# Patient Record
Sex: MALE | Race: WHITE | Employment: UNEMPLOYED | URBAN - METROPOLITAN AREA
[De-identification: names, ages, dates, MRNs, and addresses within clinical notes are randomized per-mention and may not be internally consistent; named-entity substitution may affect disease eponyms.]

---

## 2022-02-01 ENCOUNTER — APPOINTMENT (OUTPATIENT)
Dept: CT IMAGING | Age: 82
DRG: 308 | End: 2022-02-01
Payer: MEDICARE

## 2022-02-01 ENCOUNTER — HOSPITAL ENCOUNTER (INPATIENT)
Age: 82
LOS: 6 days | Discharge: NURSING, REHAB OR LONG-TERM CARE | DRG: 308 | End: 2022-02-07
Attending: EMERGENCY MEDICINE | Admitting: INTERNAL MEDICINE
Payer: MEDICARE

## 2022-02-01 DIAGNOSIS — I48.91 ATRIAL FIBRILLATION WITH RVR (HCC): Primary | ICD-10-CM

## 2022-02-01 PROBLEM — K86.89 PANCREATIC MASS: Status: ACTIVE | Noted: 2022-02-01

## 2022-02-01 PROBLEM — Z98.890 HISTORY OF ERCP: Status: ACTIVE | Noted: 2022-02-01

## 2022-02-01 PROBLEM — E05.90 HYPERTHYROIDISM: Status: ACTIVE | Noted: 2022-02-01

## 2022-02-01 LAB
ABSOLUTE EOS #: 0.04 K/UL (ref 0–0.44)
ABSOLUTE IMMATURE GRANULOCYTE: <0.03 K/UL (ref 0–0.3)
ABSOLUTE LYMPH #: 0.79 K/UL (ref 1.1–3.7)
ABSOLUTE MONO #: 0.5 K/UL (ref 0.1–1.2)
ALBUMIN SERPL-MCNC: 2.3 G/DL (ref 3.5–5.2)
ALBUMIN/GLOBULIN RATIO: 0.8 (ref 1–2.5)
ALP BLD-CCNC: 206 U/L (ref 40–129)
ALT SERPL-CCNC: 30 U/L (ref 5–41)
ANION GAP SERPL CALCULATED.3IONS-SCNC: 10 MMOL/L (ref 9–17)
AST SERPL-CCNC: 48 U/L
BASOPHILS # BLD: 0 % (ref 0–2)
BASOPHILS ABSOLUTE: <0.03 K/UL (ref 0–0.2)
BILIRUB SERPL-MCNC: 2.12 MG/DL (ref 0.3–1.2)
BILIRUBIN DIRECT: 1.43 MG/DL
BILIRUBIN, INDIRECT: 0.69 MG/DL (ref 0–1)
BNP INTERPRETATION: ABNORMAL
BUN BLDV-MCNC: 13 MG/DL (ref 8–23)
BUN/CREAT BLD: ABNORMAL (ref 9–20)
CALCIUM SERPL-MCNC: 8.4 MG/DL (ref 8.6–10.4)
CHLORIDE BLD-SCNC: 105 MMOL/L (ref 98–107)
CO2: 20 MMOL/L (ref 20–31)
CREAT SERPL-MCNC: 0.6 MG/DL (ref 0.7–1.2)
DIFFERENTIAL TYPE: ABNORMAL
EOSINOPHILS RELATIVE PERCENT: 1 % (ref 1–4)
GFR AFRICAN AMERICAN: >60 ML/MIN
GFR NON-AFRICAN AMERICAN: >60 ML/MIN
GFR SERPL CREATININE-BSD FRML MDRD: ABNORMAL ML/MIN/{1.73_M2}
GFR SERPL CREATININE-BSD FRML MDRD: ABNORMAL ML/MIN/{1.73_M2}
GLOBULIN: ABNORMAL G/DL (ref 1.5–3.8)
GLUCOSE BLD-MCNC: 118 MG/DL (ref 70–99)
HCT VFR BLD CALC: 38.3 % (ref 40.7–50.3)
HEMOGLOBIN: 12.4 G/DL (ref 13–17)
IMMATURE GRANULOCYTES: 0 %
LYMPHOCYTES # BLD: 11 % (ref 24–43)
MCH RBC QN AUTO: 32.9 PG (ref 25.2–33.5)
MCHC RBC AUTO-ENTMCNC: 32.4 G/DL (ref 28.4–34.8)
MCV RBC AUTO: 101.6 FL (ref 82.6–102.9)
MONOCYTES # BLD: 7 % (ref 3–12)
NRBC AUTOMATED: 0 PER 100 WBC
PDW BLD-RTO: 15.3 % (ref 11.8–14.4)
PLATELET # BLD: 194 K/UL (ref 138–453)
PLATELET ESTIMATE: ABNORMAL
PMV BLD AUTO: 10.9 FL (ref 8.1–13.5)
POTASSIUM SERPL-SCNC: 4.2 MMOL/L (ref 3.7–5.3)
PRO-BNP: 5356 PG/ML
RBC # BLD: 3.77 M/UL (ref 4.21–5.77)
RBC # BLD: ABNORMAL 10*6/UL
SARS-COV-2, RAPID: NOT DETECTED
SEG NEUTROPHILS: 81 % (ref 36–65)
SEGMENTED NEUTROPHILS ABSOLUTE COUNT: 5.57 K/UL (ref 1.5–8.1)
SODIUM BLD-SCNC: 135 MMOL/L (ref 135–144)
SPECIMEN DESCRIPTION: NORMAL
THYROXINE, FREE: 3.77 NG/DL (ref 0.93–1.7)
TOTAL PROTEIN: 5.3 G/DL (ref 6.4–8.3)
TROPONIN INTERP: NORMAL
TROPONIN T: NORMAL NG/ML
TROPONIN, HIGH SENSITIVITY: 14 NG/L (ref 0–22)
TSH SERPL DL<=0.05 MIU/L-ACNC: <0.01 MIU/L (ref 0.3–5)
WBC # BLD: 6.9 K/UL (ref 3.5–11.3)
WBC # BLD: ABNORMAL 10*3/UL

## 2022-02-01 PROCEDURE — 84443 ASSAY THYROID STIM HORMONE: CPT

## 2022-02-01 PROCEDURE — 93005 ELECTROCARDIOGRAM TRACING: CPT | Performed by: STUDENT IN AN ORGANIZED HEALTH CARE EDUCATION/TRAINING PROGRAM

## 2022-02-01 PROCEDURE — 99222 1ST HOSP IP/OBS MODERATE 55: CPT | Performed by: INTERNAL MEDICINE

## 2022-02-01 PROCEDURE — 2060000000 HC ICU INTERMEDIATE R&B

## 2022-02-01 PROCEDURE — 99221 1ST HOSP IP/OBS SF/LOW 40: CPT | Performed by: NURSE PRACTITIONER

## 2022-02-01 PROCEDURE — 83880 ASSAY OF NATRIURETIC PEPTIDE: CPT

## 2022-02-01 PROCEDURE — 87635 SARS-COV-2 COVID-19 AMP PRB: CPT

## 2022-02-01 PROCEDURE — 84484 ASSAY OF TROPONIN QUANT: CPT

## 2022-02-01 PROCEDURE — 6360000004 HC RX CONTRAST MEDICATION: Performed by: STUDENT IN AN ORGANIZED HEALTH CARE EDUCATION/TRAINING PROGRAM

## 2022-02-01 PROCEDURE — 84439 ASSAY OF FREE THYROXINE: CPT

## 2022-02-01 PROCEDURE — 96365 THER/PROPH/DIAG IV INF INIT: CPT

## 2022-02-01 PROCEDURE — 85025 COMPLETE CBC W/AUTO DIFF WBC: CPT

## 2022-02-01 PROCEDURE — 71260 CT THORAX DX C+: CPT

## 2022-02-01 PROCEDURE — 2580000003 HC RX 258: Performed by: STUDENT IN AN ORGANIZED HEALTH CARE EDUCATION/TRAINING PROGRAM

## 2022-02-01 PROCEDURE — 80076 HEPATIC FUNCTION PANEL: CPT

## 2022-02-01 PROCEDURE — 99284 EMERGENCY DEPT VISIT MOD MDM: CPT

## 2022-02-01 PROCEDURE — 96375 TX/PRO/DX INJ NEW DRUG ADDON: CPT

## 2022-02-01 PROCEDURE — 74177 CT ABD & PELVIS W/CONTRAST: CPT

## 2022-02-01 PROCEDURE — 2500000003 HC RX 250 WO HCPCS: Performed by: STUDENT IN AN ORGANIZED HEALTH CARE EDUCATION/TRAINING PROGRAM

## 2022-02-01 PROCEDURE — 80048 BASIC METABOLIC PNL TOTAL CA: CPT

## 2022-02-01 RX ORDER — ACETAMINOPHEN 650 MG/1
650 SUPPOSITORY RECTAL EVERY 6 HOURS PRN
Status: DISCONTINUED | OUTPATIENT
Start: 2022-02-01 | End: 2022-02-07 | Stop reason: HOSPADM

## 2022-02-01 RX ORDER — SODIUM CHLORIDE 0.9 % (FLUSH) 0.9 %
5-40 SYRINGE (ML) INJECTION PRN
Status: DISCONTINUED | OUTPATIENT
Start: 2022-02-01 | End: 2022-02-07 | Stop reason: HOSPADM

## 2022-02-01 RX ORDER — METOPROLOL TARTRATE 50 MG/1
50 TABLET, FILM COATED ORAL 2 TIMES DAILY
Status: DISCONTINUED | OUTPATIENT
Start: 2022-02-01 | End: 2022-02-02

## 2022-02-01 RX ORDER — ONDANSETRON 4 MG/1
4 TABLET, ORALLY DISINTEGRATING ORAL EVERY 8 HOURS PRN
Status: DISCONTINUED | OUTPATIENT
Start: 2022-02-01 | End: 2022-02-07 | Stop reason: HOSPADM

## 2022-02-01 RX ORDER — METHIMAZOLE 5 MG/1
10 TABLET ORAL 2 TIMES DAILY
Status: DISCONTINUED | OUTPATIENT
Start: 2022-02-01 | End: 2022-02-07 | Stop reason: HOSPADM

## 2022-02-01 RX ORDER — ONDANSETRON 2 MG/ML
4 INJECTION INTRAMUSCULAR; INTRAVENOUS EVERY 6 HOURS PRN
Status: DISCONTINUED | OUTPATIENT
Start: 2022-02-01 | End: 2022-02-07 | Stop reason: HOSPADM

## 2022-02-01 RX ORDER — DILTIAZEM HYDROCHLORIDE 5 MG/ML
10 INJECTION INTRAVENOUS ONCE
Status: COMPLETED | OUTPATIENT
Start: 2022-02-01 | End: 2022-02-01

## 2022-02-01 RX ORDER — ACETAMINOPHEN 325 MG/1
650 TABLET ORAL EVERY 6 HOURS PRN
Status: DISCONTINUED | OUTPATIENT
Start: 2022-02-01 | End: 2022-02-07 | Stop reason: HOSPADM

## 2022-02-01 RX ORDER — SODIUM CHLORIDE 0.9 % (FLUSH) 0.9 %
5-40 SYRINGE (ML) INJECTION EVERY 12 HOURS SCHEDULED
Status: DISCONTINUED | OUTPATIENT
Start: 2022-02-01 | End: 2022-02-07 | Stop reason: HOSPADM

## 2022-02-01 RX ORDER — POLYETHYLENE GLYCOL 3350 17 G/17G
17 POWDER, FOR SOLUTION ORAL DAILY PRN
Status: DISCONTINUED | OUTPATIENT
Start: 2022-02-01 | End: 2022-02-07 | Stop reason: HOSPADM

## 2022-02-01 RX ORDER — SODIUM CHLORIDE 9 MG/ML
25 INJECTION, SOLUTION INTRAVENOUS PRN
Status: DISCONTINUED | OUTPATIENT
Start: 2022-02-01 | End: 2022-02-07 | Stop reason: HOSPADM

## 2022-02-01 RX ADMIN — IOPAMIDOL 75 ML: 755 INJECTION, SOLUTION INTRAVENOUS at 11:28

## 2022-02-01 RX ADMIN — DILTIAZEM HYDROCHLORIDE 10 MG: 5 INJECTION INTRAVENOUS at 10:38

## 2022-02-01 RX ADMIN — DILTIAZEM HYDROCHLORIDE 5 MG/HR: 5 INJECTION INTRAVENOUS at 10:38

## 2022-02-01 RX ADMIN — IOPAMIDOL 75 ML: 755 INJECTION, SOLUTION INTRAVENOUS at 15:25

## 2022-02-01 ASSESSMENT — ENCOUNTER SYMPTOMS
APNEA: 0
ABDOMINAL DISTENTION: 0
STRIDOR: 0
SORE THROAT: 0
DIARRHEA: 0
BLOOD IN STOOL: 0
VOMITING: 0
NAUSEA: 0
ABDOMINAL PAIN: 0
SHORTNESS OF BREATH: 1
CONSTIPATION: 0
WHEEZING: 0
CHOKING: 0
SHORTNESS OF BREATH: 0
COUGH: 0

## 2022-02-01 NOTE — CONSULTS
THE [facility] AT [facility]  Consultation Note     . REASON FOR CONSULTATION:    EUS planned for earlier today  A. fib with RVR now rate controlled  Pneumobilia and portal venous gas found on CT    HISTORY OF PRESENT ILLNESS:     This is a 80 y.o. male with PMH as noted below including pancreatic mass. Patient was scheduled for outpatient EUS but in perioperative developed tachycardia, procedure was canceled and patient was sent to the ER. Patient normally takes Eliquis and this has been on hold the last several days for the procedure. In the ER patient complained of dyspnea upon exertion so CT chest was done that was negative for acute PE but did indicate bilateral patchy irregular groundglass and dense airspace concerning for underlying Covid pneumonia additionally in the abdomen. Large amount of tubular branching gas was noted in the liver compatible with portal venous gas and/or pneumobilia. CT abdomen and pelvis was completed which indicated pancreatic carcinoma with biliary ductal stent in place which accounts for pneumobilia seen on previous study. For the A. fib with RVR patient was started on Cardizem drip    Pt is asymptomatic. No nausea vomiting or abdominal pain.   No recent fevers or chills  Patient does admit he has had anorexia for the last few months    Previous GI history:   No previous EGD or colonoscopy on file  Patient normally follows with Dr. Ekaterina Madrigal as an outpatient with [facility]    Past Medical/Social/Family History:  Past Medical History:   Diagnosis Date    A-fib [facility])     Arthritis     Hypertension     Pancreatic mass     Thyroid disease      Past Surgical History:   Procedure Laterality Date    ERCP      HERNIA REPAIR       Family History   Problem Relation Age of Onset    High Blood Pressure Mother     Stroke Mother     Cancer Father     Colon Cancer Father     Arthritis Father     High Blood Pressure Father      Previous records/history/ and notes were reviewed    Allergies:  No Known Allergies    Home medications:  Prior to Admission medications    Medication Sig Start Date End Date Taking? Authorizing Provider   apixaban (ELIQUIS) 5 MG TABS tablet Take 5 mg by mouth 2 times daily 11/23/21   Historical Provider, MD   magnesium oxide (MAG-OX) 400 MG tablet Take 400 mg by mouth daily 1/21/22   Historical Provider, MD   potassium chloride (KLOR-CON) 10 MEQ extended release tablet Take 10 mEq by mouth 2 times daily 1/21/22   Historical Provider, MD   ursodiol (ACTIGALL) 300 MG capsule Take 300 mg by mouth 2 times daily    Historical Provider, MD   metoprolol tartrate (LOPRESSOR) 50 MG tablet Take 50 mg by mouth 3 times daily    Historical Provider, MD     .  Current Medications:  Scheduled Meds:  .  Continuous Infusions:   dilTIAZem 7.5 mg/hr (02/01/22 1154)     . PRN Meds:    REVIEW OF SYSTEMS:     Constitutional: No fever, no chills, no lethargy, no weakness, no weight loss  HEENT:  No headache, otalgia, itchy eyes, nasal discharge or sore throat. Cardiac:  No chest pain, dyspnea, orthopnea or PND. Chest:   No cough, phlegm or wheezing. Abdomen:  No abdominal pain, nausea, vomiting, constipation, diarrhea, hematochezia/melena  Neuro:  No focal weakness, abnormal movements or seizure like activity. Skin:   No rashes, no itching. :   No hematuria, no pyuria, no dysuria, no flank pain. Extremities:  No swelling or joint pains. ROS was otherwise negative except as mentioned in the [address] PHYSICAL EXAM:    /76   Pulse 106   Temp 97.8 °F (36.6 °C)   Resp 17   SpO2 97%   . TMAX[24]    General: Well developed, Well nourished, No apparent distress  Head:  Normocephalic, Atraumatic  EENT: EOMI, Sclera not icteric, Oropharynx moist  Neck:  Supple, Trachea midline  Lungs:CTA Bilaterally  Heart: RRR, No murmur, No rub, No gallop, PMI nondisplaced. Abdomen:Soft, Non tender, Not distended, BS WNL,  No masses.  No hepatomegalia   Ext:No clubbing. No cyanosis. No edema. Skin: No rashes. No jaundice. No stigmata of liver disease. Neuro:  A&O x Three, No focal neurological deficits    Imagin2022 CT abdomen and pelvis     FINDINGS:   Lower Chest: There are small to moderate bilateral pleural effusions.       Organs: There is a metallic common bile duct stent in place.  This partially   traverses and irregularly-shaped at least 4 cm pancreatic mass which envelops   the superior mesenteric artery. Debbie Plate is marked dilation of the pancreatic   duct with significant pancreatic atrophy. Debbie Plate is extensive pneumobilia. There is several layering gallstones with no definite evidence of acute   cholecystitis. Debbie Plate is no evidence of portal venous gas.       The spleen, adrenal glands, kidneys are unremarkable.       GI/Bowel: The visualized portions of the bowel demonstrate no acute   abnormality.       Pelvis: Unremarkable       Peritoneum/Retroperitoneum: No gross retroperitoneal adenopathy.       Bones/Soft Tissues: No acute abnormality.           Impression   Findings consistent with pancreatic carcinoma. Debbie Plate is a biliary ductal   stent in place which accounts for pneumobilia. 2022 CT chest rule out PE  FINDINGS:   Pulmonary Arteries: Pulmonary arteries show no evidence of intraluminal   filling defect to suggest pulmonary embolism.  Main pulmonary artery is   normal in caliber.       Mediastinum: No evidence of mediastinal lymphadenopathy.  The heart and   pericardium demonstrate no acute abnormality.  There is no acute abnormality   of the thoracic aorta.       Lungs/pleura:  Moderate to large bilateral pleural effusion.       Bilateral patchy irregular ground-glass and dense airspace densities with   underlying septal thickening in the upper lobes and middle lobe.  Imaging   features can be seen with COVID pneumonia, though are nonspecific and can   occur with a variety of infectious and noninfectious processes. Subsegmental   atelectasis posterior lung bases left greater than right.       No pneumothorax.       Upper Abdomen: Large amount of tubular branching gas in the non dependent   half of the liver region the periphery which would be compatible with portal   venous gas.  There is probably superimposed pneumobilia noting air-fluid   level in the partially imaged gallbladder and in what is probably the CBD on   series 3, image 239.  Dedicated CT abdomen pelvis with IV and water-soluble   contrast is advised.       Soft Tissues/Bones: No acute bone or soft tissue abnormality.           Impression   1. No evidence for acute pulmonary embolism. 2. Patchy bilateral irregular ground-glass and more dense airspace densities   noted with underlying reticular septal thickening.  Imaging features can be   seen with COVID pneumonia, though are nonspecific and can occur with a   variety of infectious and noninfectious processes. 3. Findings indicative of portal venous gas.  There is probably superimposed   pneumobilia.  Air-fluid level is seen gallbladder presumed partially imaged   CBD.  Dedicated CT abdomen pelvis with IV contrast and oral contrast is   recommended. Hemotological labs: Anemia studies:  No results for input(s): LABIRON, TIBC, FERRITIN, PACXLHZD21, FOLATE, OCCULTBLD in the last 72 hours. CBC:  Recent Labs     02/01/22  1012   WBC 6.9   HGB 12.4*   .6   RDW 15.3*          PT/INR:  No results for input(s): PROTIME, INR in the last 72 hours. BMP:  Recent Labs     02/01/22  1012      K 4.2      CO2 20   BUN 13   CREATININE 0.60*   GLUCOSE 118*   CALCIUM 8.4*       Liver work up:  Hepatitis Functional Panel:  Recent Labs     02/01/22  1012   ALKPHOS 206*   ALT 30   AST 48*   PROT 5.3*   BILITOT 2.12*   BILIDIR 1.43*   LABALBU 2.3*       Amylase/Lipase/Ammonia:  No results for input(s): AMYLASE, LIPASE, AMMONIA in the last 72 hours.     Acute Hepatitis Panel:  No results found for: HEPBSAG, HEPCAB, HEPBIGM, HEPAIGM         Active Problems:    Atrial fibrillation with RVR ([address]  Resolved Problems:    * No resolved hospital problems. *       GI Impression and recommendations and plan:    15-year-old gentleman with pancreatic mass concerning for malignancy who developed A. fib with RVR in preop area so procedure was canceled. Patient reports anorexia with significant weight loss. 1. Appreciate recommendations from cardiology and clearance for possible EUS on Wednesday morning  2. Full liquid diet with ensures 4 times daily-n.p.o. at midnight  3. A.m. labs including CBC BMP LFT INR  4. Continue supportive care with IV fluids antiemetics pain meds as needed per primary  5. We will follow    This plan was formulated in collaboration with Dr. Dexter Coleman MD      Electronically signed by:  [facility]  [phone]  2/1/2022    5:13 PM     This note was created with the assistance of a speech-recognition program.  Although the intention is to generate a document that actually reflects the content of the visit, no guarantees can be provided that every mistake has been identified and corrected by editing.

## 2022-02-01 NOTE — ED PROVIDER NOTES
[facility]     Emergency Department     Faculty Attestation    I performed a history and physical examination of the patient and discussed management with the resident. I reviewed the residents note and agree with the documented findings and plan of care. Any areas of disagreement are noted on the chart. I was personally present for the key portions of any procedures. I have documented in the chart those procedures where I was not present during the key portions. I have reviewed the emergency nurses triage note. I agree with the chief complaint, past medical history, past surgical history, allergies, medications, social and family history as documented unless otherwise noted below. For Physician Assistant/ Nurse Practitioner cases/documentation I have personally evaluated this patient and have completed at least one if not all key elements of the E/M (history, physical exam, and MDM). Additional findings are as noted. Patient sent here from the endoscopy suite due to A. fib RVR. Patient has a known pancreatic mass and was scheduled to have an EUS this morning. However, patient was found to be in A. fib with a heart rate in the 150s on presentation there so he was sent here for further evaluation. Patient states that he does not feel like his heart is racing or is having any palpitations. He denies chest pain. However, patient says he has been increasingly short of breath over the past few days. He says he did become short of breath on his walk out to the car to come to the hospital this morning. He denies any recent fever, cough, vomiting. He says he has been having chronic diarrhea. Patient has been off of his Eliquis for the past few days in preparation for the procedure today. On exam, patient is resting comfortably in the bed. Lungs are clear to auscultation bilaterally. Heart sounds are irregularly irregular tachycardic. Abdomen is soft and nontender.   Get EKG, chest x-ray, labs.  Will treat patient's rate and reassess.     EKG Interpretation    Interpreted by emergency department physician    Rhythm: atrial fibrillation - rapid  Rate: 140-150  Axis: normal  Ectopy: premature ventricular contractions (unifocal)  Conduction: right bundle branch block (incomplete)  ST Segments: normal  T Waves: non specific changes  Q Waves: none    Clinical Impression: non-specific EKG and atrial fibrillation RVR    MD Manjit Ruiz MD  Attending Emergency  Physician              Ismael Black MD  02/01/22 1007

## 2022-02-01 NOTE — ED NOTES
81M with known afib and pancreatic mass being sent for HR 150s-160s  Was going to have EUS today  Has been off Eliquis for last few days for procedure     Goldy Dowd RN  02/01/22 3836

## 2022-02-01 NOTE — H&P
[address]     Department of Internal Medicine - Staff Internal Medicine Teaching Service          ADMISSION NOTE/HISTORY AND PHYSICAL EXAMINATION   Date: 2/1/2022  Patient Name: Maxwell Mcintyre  Date of admission: 2/1/2022  9:55 AM  YOB: 1940  PCP: No primary care provider on file. History Obtained From:  patient    CHIEF COMPLAINT     Chief complaint: afib with RVR seen on monitor    HISTORY OF PRESENTING ILLNESS     The patient is a pleasant 80 y.o. male presents with a chief complaint of a-fib with RVR seen on monitor. Patient was originally scheduled for EUS today with Dr. Maru Cardona. When he was brought down, monitor showed that he had Afib with hr of 150. Patient denies any palpitations or CP at that time. Patient reports that this has happened before in the week prior. Notably about a month ago, patient was taken for ERCP due to pancreatic mass and had a metal stent placed in CBD . Patient was given lopressor and then switched dot cardizem gtt in the ED. Patient is currently rate controlled. No palpitations or CP present. Patient denies any fevers, chills, SOB, CP. Smokes 2 cigarettes a day, no alcohol, no illegal drugs. Labs remarkable for low TSH with eleveated free thyroxine. Patient currently does not take any thyroid medications. Currently HDS. On CT imaging show air in portal venous system but this is most likely due to recent biliary stent placement since abdomen is benign. Vitals:    02/01/22 1632   BP: 104/76   Pulse: 106   Resp:    Temp:    SpO2:        On my evaluation,  Vitals:    02/01/22 1632   BP: 104/76   Pulse: 106   Resp:    Temp:    SpO2:          Review of Systems:  Review of Systems   Constitutional: Negative for chills, diaphoresis, fatigue and fever. Respiratory: Negative for apnea, cough, choking, shortness of breath, wheezing and stridor. Cardiovascular: Negative for chest pain and leg swelling.    Gastrointestinal: Negative for abdominal distention, abdominal pain, blood in stool, constipation, diarrhea, nausea and vomiting. Genitourinary: Negative for decreased urine volume, dysuria and urgency. Skin: Negative for pallor and wound. Neurological: Negative for syncope, weakness, numbness and headaches. Psychiatric/Behavioral: Negative for agitation, behavioral problems, confusion, decreased concentration and self-injury. PAST MEDICAL HISTORY     Past Medical History:   Diagnosis Date    A-[facility] [address] [address]     Arthritis     Hypertension     Pancreatic mass     Thyroid disease        PAST SURGICAL HISTORY     Past Surgical History:   Procedure Laterality Date    ERCP      HERNIA REPAIR         ALLERGIES     Patient has no known allergies. MEDICATIONS PRIOR TO ADMISSION     Prior to Admission medications    Medication Sig Start Date End Date Taking?  Authorizing Provider   apixaban (ELIQUIS) 5 MG TABS tablet Take 5 mg by mouth 2 times daily 21   Historical Provider, MD   magnesium oxide (MAG-OX) 400 MG tablet Take 400 mg by mouth daily 22   Historical Provider, MD   potassium chloride (KLOR-CON) 10 MEQ extended release tablet Take 10 mEq by mouth 2 times daily 22   Historical Provider, MD   ursodiol (ACTIGALL) 300 MG capsule Take 300 mg by mouth 2 times daily    Historical Provider, MD   metoprolol tartrate (LOPRESSOR) 50 MG tablet Take 50 mg by mouth 3 times daily    Historical Provider, MD       SOCIAL HISTORY     Tobacco: 2 cigarettes a day  Alcohol: none  Illicits: none  Occupation: retired    FAMILY HISTORY     Family History   Problem Relation Age of Onset    High Blood Pressure Mother     Stroke Mother     Cancer Father     Colon Cancer Father     Arthritis Father     High Blood Pressure Father        PHYSICAL EXAM     Vitals: /76   Pulse 106   Temp 97.8 °F (36.6 °C)   Resp 17   SpO2 97%   Tmax: Temp (24hrs), Av.8 °F (36.6 °C), Min:97.8 °F (36.6 °C), Max:97.8 °F (36.6 °C)    Last Body weight:   Wt Readings from Last 3 Encounters:   02/01/22 137 lb (62.1 kg)     Body Mass Index : There is no height or weight on file to calculate BMI. PHYSICAL EXAMINATION:  Physical Exam  Constitutional:       General: He is not in acute distress. Appearance: Normal appearance. He is obese. He is not ill-appearing, toxic-appearing or diaphoretic. HENT:      Mouth/Throat:      Mouth: Mucous membranes are moist.      Pharynx: Oropharynx is clear. Eyes:      General: No scleral icterus. Cardiovascular:      Rate and Rhythm: Normal rate and regular rhythm. Pulses: Normal pulses. Heart sounds: Normal heart sounds. No murmur heard. No friction rub. No gallop. Pulmonary:      Effort: Pulmonary effort is normal.      Breath sounds: Normal breath sounds. Abdominal:      General: Abdomen is flat. Bowel sounds are normal. There is no distension. Palpations: Abdomen is soft. There is no mass. Tenderness: There is no abdominal tenderness. There is no guarding or rebound. Hernia: No hernia is present. Musculoskeletal:         General: No swelling, tenderness, deformity or signs of injury. Right lower leg: No edema. Left lower leg: No edema. Skin:     General: Skin is warm and dry. Coloration: Skin is not jaundiced or pale. Findings: No bruising. Neurological:      General: No focal deficit present. Mental Status: He is alert and oriented to person, place, and time. Cranial Nerves: No cranial nerve deficit. Motor: No weakness. Psychiatric:         Mood and Affect: Mood normal.         Behavior: Behavior normal.         Thought Content:  Thought content normal.         Judgment: Judgment normal.           INVESTIGATIONS     Laboratory Testing:     Recent Results (from the past 24 hour(s))   CBC Auto Differential    Collection Time: 02/01/22 10:12 AM   Result Value Ref Range    WBC 6.9 3.5 - 11.3 k/uL    RBC 3.77 (L) 4.21 - 5.77 m/uL Hemoglobin 12.4 (L) 13.0 - 17.0 g/dL    Hematocrit 38.3 (L) 40.7 - 50.3 %    .6 82.6 - 102.9 fL    MCH 32.9 25.2 - 33.5 pg    MCHC 32.4 28.4 - 34.8 g/dL    RDW 15.3 (H) 11.8 - 14.4 %    Platelets 105 300 - 142 k/uL    MPV 10.9 8.1 - 13.5 fL    NRBC Automated 0.0 0.0 per 100 WBC    Differential Type NOT REPORTED     Seg Neutrophils 81 (H) 36 - 65 %    Lymphocytes 11 (L) 24 - 43 %    Monocytes 7 3 - 12 %    Eosinophils % 1 1 - 4 %    Basophils 0 0 - 2 %    Immature Granulocytes 0 0 %    Segs Absolute 5.57 1.50 - 8.10 k/uL    Absolute Lymph # 0.79 (L) 1.10 - 3.70 k/uL    Absolute Mono # 0.50 0.10 - 1.20 k/uL    Absolute Eos # 0.04 0.00 - 0.44 k/uL    Basophils Absolute <0.03 0.00 - 0.20 k/uL    Absolute Immature Granulocyte <0.03 0.00 - 0.30 k/uL    WBC Morphology NOT REPORTED     RBC Morphology ANISOCYTOSIS PRESENT     Platelet Estimate NOT REPORTED    Basic Metabolic Panel w/ Reflex to MG    Collection Time: 02/01/22 10:12 AM   Result Value Ref Range    Glucose 118 (H) 70 - 99 mg/dL    BUN 13 8 - 23 mg/dL    CREATININE 0.60 (L) 0.70 - 1.20 mg/dL    Bun/Cre Ratio NOT REPORTED 9 - 20    Calcium 8.4 (L) 8.6 - 10.4 mg/dL    Sodium 135 135 - 144 mmol/L    Potassium 4.2 3.7 - 5.3 mmol/L    Chloride 105 98 - 107 mmol/L    CO2 20 20 - 31 mmol/L    Anion Gap 10 9 - 17 mmol/L    GFR Non-African American >60 >60 mL/min    GFR African American >60 >60 mL/min    GFR Comment          GFR Staging NOT REPORTED    Troponin    Collection Time: 02/01/22 10:12 AM   Result Value Ref Range    Troponin, High Sensitivity 14 0 - 22 ng/L    Troponin T NOT REPORTED <0.03 ng/mL    Troponin Interp NOT REPORTED    Brain Natriuretic Peptide    Collection Time: 02/01/22 10:12 AM   Result Value Ref Range    Pro-BNP 5,356 (H) <300 pg/mL    BNP Interpretation NOT REPORTED    TSH with Reflex    Collection Time: 02/01/22 10:12 AM   Result Value Ref Range    TSH <0.01 (L) 0.30 - 5.00 mIU/L   Hepatic Function Panel    Collection Time: 02/01/22 10:12 AM   Result Value Ref Range    Albumin 2.3 (L) 3.5 - 5.2 g/dL    Alkaline Phosphatase 206 (H) 40 - 129 U/L    ALT 30 5 - 41 U/L    AST 48 (H) <40 U/L    Total Bilirubin 2.12 (H) 0.3 - 1.2 mg/dL    Bilirubin, Direct 1.43 (H) <0.31 mg/dL    Bilirubin, Indirect 0.69 0.00 - 1.00 mg/dL    Total Protein 5.3 (L) 6.4 - 8.3 g/dL    Globulin NOT REPORTED 1.5 - 3.8 g/dL    Albumin/Globulin Ratio 0.8 (L) 1.0 - 2.5   T4, Free    Collection Time: 02/01/22 10:12 AM   Result Value Ref Range    Thyroxine, Free 3.77 (H) 0.93 - 1.70 ng/dL   COVID-19, Rapid    Collection Time: 02/01/22  2:36 PM    Specimen: Nasopharyngeal Swab   Result Value Ref Range    Specimen Description . NASOPHARYNGEAL SWAB     SARS-CoV-2, Rapid Not Detected Not Detected       Imaging:   CT ABDOMEN PELVIS W IV CONTRAST Additional Contrast? None    Result Date: 2/1/2022  Findings consistent with pancreatic carcinoma. There is a biliary ductal stent in place which accounts for pneumobilia. RECOMMENDATIONS: Unavailable     CT CHEST PULMONARY EMBOLISM W CONTRAST    Result Date: 2/1/2022  1. No evidence for acute pulmonary embolism. 2. Patchy bilateral irregular ground-glass and more dense airspace densities noted with underlying reticular septal thickening. Imaging features can be seen with COVID pneumonia, though are nonspecific and can occur with a variety of infectious and noninfectious processes. 3. Findings indicative of portal venous gas. There is probably superimposed pneumobilia. Air-fluid level is seen gallbladder presumed partially imaged CBD. Dedicated CT abdomen pelvis with IV contrast and oral contrast is recommended.  RECOMMENDATIONS: Unavailable       ASSESSMENT & PLAN     ASSESSMENT:     Primary Problem  <principal problem not specified>    Active Hospital Problems    Diagnosis Date Noted    Atrial fibrillation with RVR ([facility] [address] [I48.91] 02/01/2022    Pancreatic mass [K86.89] 02/01/2022    History of ERCP [Z98.890] 02/01/2022    Hyperthyroidism [E05.90] 02/01/2022       PLAN:     IMPRESSION  This is a 80 y.o. male who presented with above mentioned complaints and was admitted to inpatient service for further management as follows: Active Problems:    Atrial fibrillation with RVR (HCC)    Pancreatic mass    History of ERCP    Hyperthyroidism  Resolved Problems:    * No resolved hospital problems.  *    Afib with RVR  - continuous tele  - continue cardizem gtt  - cardiac consult for EUS tommorow    Pancreatic mass  - stent placed 1 month ago  - will need EUS  - GI on board plan for tommorow  - AC and AP held  - NPO at [facility], now on FLD     Hyperthyroidism  - found to have low TSH of <0.01  - free thyroxine 3.77  - no prior hx of thyroid disease    Hx of ERCP  - stent placed 1 month ago  - CT showed pneumobilia      PT/OT/SW-consulted  Discharge Planning:consulted       Kendal Yu MD  Internal Medicine Resident  [facility]  2/1/2022 5:34 PM

## 2022-02-01 NOTE — Clinical Note
Patient Class: Inpatient [101]   REQUIRED: Diagnosis: Atrial fibrillation with RVR ([address] [[id number]]   Estimated Length of Stay: Estimated stay of more than 2 midnights   Admitting Provider: Fariba Chavira [[id number]]   Telemetry/Cardiac Monitoring Required?: Yes

## 2022-02-01 NOTE — ED PROVIDER NOTES
[facility] ED  Emergency Department Encounter  Emergency Medicine Resident     Pt Bibi Anderson  MRN: [id number]  [place] 1940  Date of evaluation: 22  PCP:  No primary care provider on file. CHIEF COMPLAINT       Chief Complaint   Patient presents with    Atrial Fibrillation       HISTORY OF PRESENT ILLNESS  (Location/Symptom, Timing/Onset, Context/Setting, Quality, Duration, Modifying Factors, Severity.)      Sparkle Altamirano is a 80 y.o. male who presents with asymptomatic A. fib with RVR. Patient was at the endoscopy suite for an outpatient and he will assess for evaluation of the pancreatic mass needs noted to have a heart rate in the 150s. Patient does have a history of A. fib, Eliquis on hold for procedure. Patient without specific complaints but does note progressively worsening shortness of breath over the last several days. No history of VTE. A. fib with RVR rate controlled on metoprolol 50 mg every 8 hours, did take his morning dose today. Has been n.p.o. for the procedure. Patient with no abdominal pain or other complaints at this time, no fevers chills nausea vomiting chest pain abdominal pain constipation diarrhea or dysuria. PAST MEDICAL / SURGICAL / SOCIAL / FAMILY HISTORY      has a past medical history of A-fib ([address] Arthritis, Hypertension, Pancreatic mass, and Thyroid disease. has a past surgical history that includes ERCP and hernia repair.     Social History     Socioeconomic History    Marital status: Single     Spouse name: Not on file    Number of children: Not on file    Years of education: Not on file    Highest education level: Not on file   Occupational History    Not on file   Tobacco Use    Smoking status: Former Smoker     Quit date: 2021     Years since quittin.1    Smokeless tobacco: Never Used   Vaping Use    Vaping Use: Never used   Substance and Sexual Activity    Alcohol use: Not Currently    Drug use: Never    Sexual activity: Not on file   Other Topics Concern    Not on file   Social History Narrative    Not on file     Social Determinants of Health     Financial Resource Strain:     Difficulty of Paying Living Expenses: Not on file   Food Insecurity:     Worried About Running Out of Food in the Last Year: Not on file    Jodie of Food in the Last Year: Not on file   Transportation Needs:     Lack of Transportation (Medical): Not on file    Lack of Transportation (Non-Medical): Not on file   Physical Activity:     Days of Exercise per Week: Not on file    Minutes of Exercise per Session: Not on file   Stress:     Feeling of Stress : Not on file   Social Connections:     Frequency of Communication with Friends and Family: Not on file    Frequency of Social Gatherings with Friends and Family: Not on file    Attends [employer] Services: Not on file    Active Member of [address] [place] [address] [employer] or Organizations: Not on file    Attends Club or Organization Meetings: Not on file    Marital Status: Not on file   Intimate Partner Violence:     Fear of Current or Ex-Partner: Not on file    Emotionally Abused: Not on file    Physically Abused: Not on file    Sexually Abused: Not on file   Housing Stability:     Unable to Pay for Housing in the Last Year: Not on file    Number of [place] in the Last Year: Not on file    Unstable Housing in the Last Year: Not on file       Family History   Problem Relation Age of Onset    High Blood Pressure Mother     Stroke Mother     Cancer Father     Colon Cancer Father     Arthritis Father     High Blood Pressure Father        Allergies:  Patient has no known allergies. Home Medications:  Prior to Admission medications    Medication Sig Start Date End Date Taking?  Authorizing Provider   apixaban (ELIQUIS) 5 MG TABS tablet Take 5 mg by mouth 2 times daily 11/23/21   Historical Provider, MD   magnesium oxide (MAG-OX) 400 MG tablet Take 400 mg by mouth daily 1/21/22   Historical Provider, MD   potassium chloride (KLOR-CON) 10 MEQ extended release tablet Take 10 mEq by mouth 2 times daily 1/21/22   Historical Provider, MD   ursodiol (ACTIGALL) 300 MG capsule Take 300 mg by mouth 2 times daily    Historical Provider, MD   metoprolol tartrate (LOPRESSOR) 50 MG tablet Take 50 mg by mouth 3 times daily    Historical Provider, MD       REVIEW OF SYSTEMS    (2-9 systems for level 4, 10 or more for level 5)      Review of Systems   Constitutional: Negative for fever. HENT: Negative for sore throat. Eyes: Negative for visual disturbance. Respiratory: Positive for shortness of breath. Cardiovascular: Negative for chest pain. Gastrointestinal: Negative for abdominal pain, constipation, diarrhea, nausea and vomiting. Genitourinary: Negative for decreased urine volume. Musculoskeletal: Negative for arthralgias and myalgias. Skin: Negative for wound. Neurological: Negative for weakness, light-headedness and headaches. Psychiatric/Behavioral: Negative for confusion. PHYSICAL EXAM   (up to 7 for level 4, 8 or more for level 5)      INITIAL VITALS:   /68   Pulse 109   Temp 97.8 °F (36.6 °C)   Resp 17   SpO2 97%     Physical Exam  Vitals and nursing note reviewed. Constitutional:       General: He is not in acute distress. Appearance: Normal appearance. He is well-developed. He is not toxic-appearing. HENT:      Head: Normocephalic and atraumatic. Right Ear: External ear normal.      Left Ear: External ear normal.      Nose: Nose normal.      Mouth/Throat:      Mouth: Mucous membranes are moist.   Neck:      Trachea: Trachea normal. No tracheal deviation. Cardiovascular:      Rate and Rhythm: Tachycardia present. Rhythm irregular. Pulmonary:      Effort: Pulmonary effort is normal. No respiratory distress. Abdominal:      Palpations: Abdomen is soft. Tenderness: There is no abdominal tenderness. Musculoskeletal:         General: No deformity. 25.2 - 33.5 pg    MCHC 32.4 28.4 - 34.8 g/dL    RDW 15.3 (H) 11.8 - 14.4 %    Platelets 157 769 - 637 k/uL    MPV 10.9 8.1 - 13.5 fL    NRBC Automated 0.0 0.0 per 100 WBC    Differential Type NOT REPORTED     Seg Neutrophils 81 (H) 36 - 65 %    Lymphocytes 11 (L) 24 - 43 %    Monocytes 7 3 - 12 %    Eosinophils % 1 1 - 4 %    Basophils 0 0 - 2 %    Immature Granulocytes 0 0 %    Segs Absolute 5.57 1.50 - 8.10 k/uL    Absolute Lymph # 0.79 (L) 1.10 - 3.70 k/uL    Absolute Mono # 0.50 0.10 - 1.20 k/uL    Absolute Eos # 0.04 0.00 - 0.44 k/uL    Basophils Absolute <0.03 0.00 - 0.20 k/uL    Absolute Immature Granulocyte <0.03 0.00 - 0.30 k/uL    WBC Morphology NOT REPORTED     RBC Morphology ANISOCYTOSIS PRESENT     Platelet Estimate NOT REPORTED    Basic Metabolic Panel w/ Reflex to MG   Result Value Ref Range    Glucose 118 (H) 70 - 99 mg/dL    BUN 13 8 - 23 mg/dL    CREATININE 0.60 (L) 0.70 - 1.20 mg/dL    Bun/Cre Ratio NOT REPORTED 9 - 20    Calcium 8.4 (L) 8.6 - 10.4 mg/dL    Sodium 135 135 - 144 mmol/L    Potassium 4.2 3.7 - 5.3 mmol/L    Chloride 105 98 - 107 mmol/L    CO2 20 20 - 31 mmol/L    Anion Gap 10 9 - 17 mmol/L    GFR Non-African American >60 >60 mL/min    GFR African American >60 >60 mL/min    GFR Comment          GFR Staging NOT REPORTED    Troponin   Result Value Ref Range    Troponin, High Sensitivity 14 0 - 22 ng/L    Troponin T NOT REPORTED <0.03 ng/mL    Troponin Interp NOT REPORTED    Brain Natriuretic Peptide   Result Value Ref Range    Pro-BNP 5,356 (H) <300 pg/mL    BNP Interpretation NOT REPORTED    TSH with Reflex   Result Value Ref Range    TSH <0.01 (L) 0.30 - 5.00 mIU/L   Hepatic Function Panel   Result Value Ref Range    Albumin 2.3 (L) 3.5 - 5.2 g/dL    Alkaline Phosphatase 206 (H) 40 - 129 U/L    ALT 30 5 - 41 U/L    AST 48 (H) <40 U/L    Total Bilirubin 2.12 (H) 0.3 - 1.2 mg/dL    Bilirubin, Direct 1.43 (H) <0.31 mg/dL    Bilirubin, Indirect 0.69 0.00 - 1.00 mg/dL    Total Protein 5.3 portal venous gas. There is probably superimposed pneumobilia noting air-fluid level in the partially imaged gallbladder and in what is probably the CBD on series 3, image 239. Dedicated CT abdomen pelvis with IV and water-soluble contrast is advised. Soft Tissues/Bones: No acute bone or soft tissue abnormality. 1. No evidence for acute pulmonary embolism. 2. Patchy bilateral irregular ground-glass and more dense airspace densities noted with underlying reticular septal thickening. Imaging features can be seen with COVID pneumonia, though are nonspecific and can occur with a variety of infectious and noninfectious processes. 3. Findings indicative of portal venous gas. There is probably superimposed pneumobilia. Air-fluid level is seen gallbladder presumed partially imaged CBD. Dedicated CT abdomen pelvis with IV contrast and oral contrast is recommended. RECOMMENDATIONS: Unavailable       EKG  EKG Interpretation    Interpreted by me    Rhythm: A. fib with RVR  Rate: Tachycardic  Axis: normal  Ectopy: PVCs  Conduction: Incomplete right bundle branch block  ST Segments: no acute change  T Waves: Nonspecific  Q Waves: none    Clinical Impression: A. fib with RVR    All EKG's are interpreted by the Emergency Department Physician who either signs or Co-signs this chart in the absence of a cardiologist.    EMERGENCY DEPARTMENT COURSE:  Patient found seated upright in bed, no acute distress, not ill or toxic appearing. Engaged in cooperative exam.  Physical exam notable for a irregularly irregular rhythm with tachycardia ranging from the 130s to 160s consistent with A. fib with RVR. Patient does take a beta-blocker at home however metoprolol drip is unavailable, as such we will use diltiazem given patient's borderline blood pressure and concerns for hypotension associated with use of labetalol.   Patient is reporting some exertional dyspnea given the recent discontinuation/holding of Eliquis for the procedure CT PE study to evaluate for pulmonary embolus. CT without signs of embolus however there is notable portal venous gas. Discussed this finding with GI, likely no need for emergent intervention given patient's stable appearance and known pancreatic mass, plan for CT abdomen pelvis for further evaluation. Laboratory work-up notable for elevation in BNP without prior available for comparison, undetectable TSH with elevated T4. Patient was unclear of his hypothyroid history however on chart review in [facility] it is notable that he is on methimazole. Elevated T4 may be underlying etiology for patient's breakthrough A. fib with RVR. Other findings notable on imaging include pleural effusions. There are groundglass opacities noted on CT imaging however patient's Covid is negative. No respiratory symptoms or shortness of breath at rest, no cough, low suspicion for pneumonia or bacterial etiology. GI with plans for EGD tomorrow. Discussed case with internal medicine who agreed to admit patient. Discussed admission plan with patient who is in agreement. Educated on likely hospitalization course with all questions answered to patient satisfaction. PROCEDURES:  None    CONSULTS:  IP CONSULT TO GI  IP CONSULT TO INTERNAL MEDICINE    CRITICAL CARE:  None    FINAL IMPRESSION      1. Atrial fibrillation with RVR ([facility] [address]          DISPOSITION / PLAN     DISPOSITION Admitted 02/01/2022 02:32:59 PM      PATIENT REFERRED TO:  No follow-up provider specified.     DISCHARGE MEDICATIONS:  New Prescriptions    No medications on file       Mariluz Lemus MD  Emergency Medicine Resident    (Please note that portions of this note were completed with a voice recognition program.  Efforts were made to edit the dictations but occasionally words are mis-transcribed.)        Mariluz Lemus MD  Resident  02/01/22 [id number]

## 2022-02-01 NOTE — ED NOTES
Labeled blood specimen collected and sent to lab via tube system.      Cristian Valencia, SHANTANU  02/01/22 [id number]

## 2022-02-01 NOTE — ED NOTES
Pt to room 24 from Pre procedure lab. Pt was supposed to have EUS today, but pts heart rate was 150s-160s and they didn't do the procedure. Pt reports that he has been off his eliquis and lopressor in preparation for procedure. Pt reports that he isn't having any chest pain, denies any other complaints. Pt placed on continuous cardiac monitor, bp and pulse ox. EKG completed, IV established, blood work drawn. Pt alert and oriented x4, talking in complete sentences, respirations even and unlabored. Pt acting age appropriate.  White board updated, will continue to plan of care         Valentine Fischer RN  02/01/22 1002

## 2022-02-01 NOTE — ED PROVIDER NOTES
Faculty Sign-Out Attestation  Handoff taken on the following patient from prior Attending Physician: Ofelia Mosqueda    I was available and discussed any additional care issues that arose and coordinated the management plans with the resident(s) caring for the patient during my duty period. Any areas of disagreement with residents documentation of care or procedures are noted on the chart. I was personally present for the key portions of any/all procedures during my duty period. I have documented in the chart those procedures where I was not present during the key portions. 70-year-old male with a history of A. fib previously on Eliquis, stopped for the last several days, with plan to do EUS with concern for pancreatic mass today. Arrived to endoscopy with heart rate in the 150s, A. fib with RVR, and was sent to the ER. Now on Cardizem drip. Plan to admit to medicine with GI consulted to determine next steps for needed procedure.     Na Wilder MD  Attending Physician        Na Wilder MD  02/01/22 8229

## 2022-02-02 ENCOUNTER — APPOINTMENT (OUTPATIENT)
Dept: ULTRASOUND IMAGING | Age: 82
DRG: 308 | End: 2022-02-02
Payer: MEDICARE

## 2022-02-02 LAB
ABSOLUTE EOS #: 0.17 K/UL (ref 0–0.44)
ABSOLUTE IMMATURE GRANULOCYTE: 0.03 K/UL (ref 0–0.3)
ABSOLUTE LYMPH #: 0.95 K/UL (ref 1.1–3.7)
ABSOLUTE MONO #: 0.56 K/UL (ref 0.1–1.2)
ALBUMIN SERPL-MCNC: 2.1 G/DL (ref 3.5–5.2)
ALBUMIN/GLOBULIN RATIO: 0.7 (ref 1–2.5)
ALP BLD-CCNC: 193 U/L (ref 40–129)
ALT SERPL-CCNC: 28 U/L (ref 5–41)
ANION GAP SERPL CALCULATED.3IONS-SCNC: 11 MMOL/L (ref 9–17)
AST SERPL-CCNC: 43 U/L
BASOPHILS # BLD: 1 % (ref 0–2)
BASOPHILS ABSOLUTE: 0.05 K/UL (ref 0–0.2)
BILIRUB SERPL-MCNC: 1.89 MG/DL (ref 0.3–1.2)
BUN BLDV-MCNC: 12 MG/DL (ref 8–23)
BUN/CREAT BLD: ABNORMAL (ref 9–20)
CALCIUM SERPL-MCNC: 8 MG/DL (ref 8.6–10.4)
CHLORIDE BLD-SCNC: 108 MMOL/L (ref 98–107)
CO2: 18 MMOL/L (ref 20–31)
CREAT SERPL-MCNC: 0.49 MG/DL (ref 0.7–1.2)
DIFFERENTIAL TYPE: ABNORMAL
EOSINOPHILS RELATIVE PERCENT: 3 % (ref 1–4)
GFR AFRICAN AMERICAN: >60 ML/MIN
GFR NON-AFRICAN AMERICAN: >60 ML/MIN
GFR SERPL CREATININE-BSD FRML MDRD: ABNORMAL ML/MIN/{1.73_M2}
GFR SERPL CREATININE-BSD FRML MDRD: ABNORMAL ML/MIN/{1.73_M2}
GLUCOSE BLD-MCNC: 86 MG/DL (ref 70–99)
HCT VFR BLD CALC: 36.8 % (ref 40.7–50.3)
HEMOGLOBIN: 12 G/DL (ref 13–17)
IMMATURE GRANULOCYTES: 0 %
INR BLD: 1.2
LV EF: 55 %
LVEF MODALITY: NORMAL
LYMPHOCYTES # BLD: 14 % (ref 24–43)
MAGNESIUM: 1.9 MG/DL (ref 1.6–2.6)
MCH RBC QN AUTO: 32.6 PG (ref 25.2–33.5)
MCHC RBC AUTO-ENTMCNC: 32.6 G/DL (ref 28.4–34.8)
MCV RBC AUTO: 100 FL (ref 82.6–102.9)
MONOCYTES # BLD: 8 % (ref 3–12)
NRBC AUTOMATED: 0 PER 100 WBC
PARTIAL THROMBOPLASTIN TIME: 26 SEC (ref 20.5–30.5)
PARTIAL THROMBOPLASTIN TIME: 34.1 SEC (ref 20.5–30.5)
PDW BLD-RTO: 15.2 % (ref 11.8–14.4)
PLATELET # BLD: 163 K/UL (ref 138–453)
PLATELET ESTIMATE: ABNORMAL
PMV BLD AUTO: 10.7 FL (ref 8.1–13.5)
POTASSIUM SERPL-SCNC: 4 MMOL/L (ref 3.7–5.3)
PROTHROMBIN TIME: 12.6 SEC (ref 9.1–12.3)
RBC # BLD: 3.68 M/UL (ref 4.21–5.77)
RBC # BLD: ABNORMAL 10*6/UL
SEG NEUTROPHILS: 74 % (ref 36–65)
SEGMENTED NEUTROPHILS ABSOLUTE COUNT: 5.07 K/UL (ref 1.5–8.1)
SODIUM BLD-SCNC: 137 MMOL/L (ref 135–144)
TOTAL PROTEIN: 5 G/DL (ref 6.4–8.3)
WBC # BLD: 6.8 K/UL (ref 3.5–11.3)
WBC # BLD: ABNORMAL 10*3/UL

## 2022-02-02 PROCEDURE — 6360000002 HC RX W HCPCS: Performed by: STUDENT IN AN ORGANIZED HEALTH CARE EDUCATION/TRAINING PROGRAM

## 2022-02-02 PROCEDURE — 36415 COLL VENOUS BLD VENIPUNCTURE: CPT

## 2022-02-02 PROCEDURE — 85025 COMPLETE CBC W/AUTO DIFF WBC: CPT

## 2022-02-02 PROCEDURE — 99232 SBSQ HOSP IP/OBS MODERATE 35: CPT | Performed by: INTERNAL MEDICINE

## 2022-02-02 PROCEDURE — 85730 THROMBOPLASTIN TIME PARTIAL: CPT

## 2022-02-02 PROCEDURE — 6370000000 HC RX 637 (ALT 250 FOR IP): Performed by: INTERNAL MEDICINE

## 2022-02-02 PROCEDURE — 6360000002 HC RX W HCPCS

## 2022-02-02 PROCEDURE — 2500000003 HC RX 250 WO HCPCS: Performed by: STUDENT IN AN ORGANIZED HEALTH CARE EDUCATION/TRAINING PROGRAM

## 2022-02-02 PROCEDURE — 6370000000 HC RX 637 (ALT 250 FOR IP): Performed by: STUDENT IN AN ORGANIZED HEALTH CARE EDUCATION/TRAINING PROGRAM

## 2022-02-02 PROCEDURE — 85610 PROTHROMBIN TIME: CPT

## 2022-02-02 PROCEDURE — 93306 TTE W/DOPPLER COMPLETE: CPT

## 2022-02-02 PROCEDURE — 2060000000 HC ICU INTERMEDIATE R&B

## 2022-02-02 PROCEDURE — 2580000003 HC RX 258: Performed by: STUDENT IN AN ORGANIZED HEALTH CARE EDUCATION/TRAINING PROGRAM

## 2022-02-02 PROCEDURE — 83735 ASSAY OF MAGNESIUM: CPT

## 2022-02-02 PROCEDURE — 80053 COMPREHEN METABOLIC PANEL: CPT

## 2022-02-02 RX ORDER — METOPROLOL TARTRATE 50 MG/1
100 TABLET, FILM COATED ORAL 2 TIMES DAILY
Status: DISCONTINUED | OUTPATIENT
Start: 2022-02-02 | End: 2022-02-07 | Stop reason: HOSPADM

## 2022-02-02 RX ORDER — FUROSEMIDE 10 MG/ML
20 INJECTION INTRAMUSCULAR; INTRAVENOUS DAILY
Status: COMPLETED | OUTPATIENT
Start: 2022-02-02 | End: 2022-02-04

## 2022-02-02 RX ORDER — HEPARIN SODIUM 1000 [USP'U]/ML
30 INJECTION, SOLUTION INTRAVENOUS; SUBCUTANEOUS PRN
Status: DISCONTINUED | OUTPATIENT
Start: 2022-02-02 | End: 2022-02-05

## 2022-02-02 RX ORDER — SODIUM CHLORIDE, SODIUM LACTATE, POTASSIUM CHLORIDE, AND CALCIUM CHLORIDE .6; .31; .03; .02 G/100ML; G/100ML; G/100ML; G/100ML
500 INJECTION, SOLUTION INTRAVENOUS ONCE
Status: COMPLETED | OUTPATIENT
Start: 2022-02-02 | End: 2022-02-02

## 2022-02-02 RX ORDER — HEPARIN SODIUM 10000 [USP'U]/100ML
5-30 INJECTION, SOLUTION INTRAVENOUS CONTINUOUS
Status: DISCONTINUED | OUTPATIENT
Start: 2022-02-02 | End: 2022-02-05

## 2022-02-02 RX ORDER — HEPARIN SODIUM 1000 [USP'U]/ML
60 INJECTION, SOLUTION INTRAVENOUS; SUBCUTANEOUS PRN
Status: DISCONTINUED | OUTPATIENT
Start: 2022-02-02 | End: 2022-02-05

## 2022-02-02 RX ORDER — FUROSEMIDE 10 MG/ML
INJECTION INTRAMUSCULAR; INTRAVENOUS
Status: COMPLETED
Start: 2022-02-02 | End: 2022-02-02

## 2022-02-02 RX ORDER — DIGOXIN 0.25 MG/ML
250 INJECTION INTRAMUSCULAR; INTRAVENOUS ONCE
Status: COMPLETED | OUTPATIENT
Start: 2022-02-02 | End: 2022-02-02

## 2022-02-02 RX ADMIN — HEPARIN SODIUM 14 UNITS/KG/HR: 5000 INJECTION INTRAVENOUS; SUBCUTANEOUS at 20:14

## 2022-02-02 RX ADMIN — SODIUM CHLORIDE, PRESERVATIVE FREE 30 ML: 5 INJECTION INTRAVENOUS at 08:07

## 2022-02-02 RX ADMIN — DIGOXIN 250 MCG: 250 INJECTION, SOLUTION INTRAMUSCULAR; INTRAVENOUS; PARENTERAL at 09:56

## 2022-02-02 RX ADMIN — FUROSEMIDE 20 MG: 10 INJECTION, SOLUTION INTRAVENOUS at 15:55

## 2022-02-02 RX ADMIN — HEPARIN SODIUM 1860 UNITS: 1000 INJECTION INTRAVENOUS; SUBCUTANEOUS at 20:15

## 2022-02-02 RX ADMIN — AMIODARONE HYDROCHLORIDE 0.5 MG/MIN: 50 INJECTION, SOLUTION INTRAVENOUS at 14:20

## 2022-02-02 RX ADMIN — HEPARIN SODIUM 12 UNITS/KG/HR: 5000 INJECTION INTRAVENOUS; SUBCUTANEOUS at 14:20

## 2022-02-02 RX ADMIN — SODIUM CHLORIDE, POTASSIUM CHLORIDE, SODIUM LACTATE AND CALCIUM CHLORIDE 500 ML: 600; 310; 30; 20 INJECTION, SOLUTION INTRAVENOUS at 08:06

## 2022-02-02 RX ADMIN — AMIODARONE HYDROCHLORIDE 0.5 MG/MIN: 50 INJECTION, SOLUTION INTRAVENOUS at 20:18

## 2022-02-02 RX ADMIN — METOPROLOL TARTRATE 50 MG: 50 TABLET, FILM COATED ORAL at 08:06

## 2022-02-02 RX ADMIN — AMIODARONE HYDROCHLORIDE 1 MG/MIN: 50 INJECTION, SOLUTION INTRAVENOUS at 09:21

## 2022-02-02 RX ADMIN — METOPROLOL TARTRATE 100 MG: 50 TABLET, FILM COATED ORAL at 20:19

## 2022-02-02 RX ADMIN — METHIMAZOLE 10 MG: 5 TABLET ORAL at 20:18

## 2022-02-02 RX ADMIN — METHIMAZOLE 10 MG: 5 TABLET ORAL at 08:16

## 2022-02-02 RX ADMIN — AMIODARONE HYDROCHLORIDE 150 MG: 50 INJECTION, SOLUTION INTRAVENOUS at 09:07

## 2022-02-02 RX ADMIN — DILTIAZEM HYDROCHLORIDE 7.5 MG/HR: 5 INJECTION INTRAVENOUS at 00:15

## 2022-02-02 NOTE — PLAN OF CARE
GI plan of care    Patient was scheduled for EUS today with Dr. Allan Briggs but due to current cardiac status we will postpone procedure until cardiology has evaluated treated patient and cleared for EUS. This is tentatively planned for Friday and we will follow from a far. Family and patient updated.      Alexa Moreno

## 2022-02-02 NOTE — CARE COORDINATION
Case Management Initial Discharge Plan  Harjit Mendoza,             Met with:patient to discuss discharge plans. Information verified: address, contacts, phone number, , insurance Yes  Insurance Provider: Medicare    Emergency Contact/Next of Kin name & number:   Brant Juarez ([phone])  Who are involved in patient's support system? Sister, brother in law    PCP: Donna Mcpherson CNP  Date of last visit: unknown      Discharge Planning    Living Arrangements:    alone    Home has 2 stories  7 stairs to climb to get into front door, 7 stairs to climb to reach second floor  Location of bedroom/bathroom in home upper    Patient able to perform ADL's:Assisted    Current Services (outpatient & in home) [employer]  DME equipment: cane  DME provider: na    Is patient receiving oral anticoagulation therapy? Yes- Eliquis    If indicated:   Physician managing anticoagulation treatment: unsure    Potential Assistance Needed:       Patient agreeable to home care: Yes  Freedom of choice provided:  n/a    Prior SNF/Rehab Placement and Facility: none  Agreeable to SNF/Rehab: No  [place] of choice provided: n/a     Evaluation: no    Expected Discharge date:       Patient expects to be discharged to: If home: is the family and/or caregiver wiling & able to provide support at home? yes  Who will be providing this support? Sister and brother in law live near    Follow Up Appointment: Best Day/ Time:      Transportation provider:   Transportation arrangements needed for discharge: No    Readmission Risk              Risk of Unplanned Readmission:  11             Does patient have a readmission risk score greater than 14?: No  If yes, follow-up appointment must be made within 7 days of discharge. Goals of Care:       Educated patient and sister on transitional options, provided freedom of choice and are agreeable with plan      Discharge Plan: d/c home, current with [facility].  Verified with Alannah Koch [facility], that pt is current.            Electronically signed by Nelia Red RN on 2/2/22 at 1:32 PM EST

## 2022-02-02 NOTE — FLOWSHEET NOTE
Triston MUÑOZ office called and I spoke to a  and notified, and double checked that the MD was aware of cardiology wanting to notify him of pt admit to hospital. [place] aware, and will notify MD. France Wilkerson at [phone]

## 2022-02-02 NOTE — PLAN OF CARE
Problem: Skin Integrity:  Goal: Will show no infection signs and symptoms  Description: Will show no infection signs and symptoms  Outcome: Ongoing  Goal: Absence of new skin breakdown  Description: Absence of new skin breakdown  Outcome: Ongoing     Problem: Falls - Risk of:  Goal: Will remain free from falls  Description: Will remain free from falls  Outcome: Ongoing  Goal: Absence of physical injury  Description: Absence of physical injury  Outcome: Ongoing     Problem:  Activity:  Goal: Ability to tolerate increased activity will improve  Description: Ability to tolerate increased activity will improve  2/2/2022 1205 by Kale Briggs RN  Outcome: Ongoing  2/2/2022 0306 by Nedra Jacques RN  Outcome: Ongoing  Goal: Expression of feelings of increased energy will increase  Description: Expression of feelings of increased energy will increase  2/2/2022 1205 by Kale Briggs RN  Outcome: Ongoing  2/2/2022 0306 by Nedra Jacques RN  Outcome: Ongoing     Problem: Cardiac:  Goal: Ability to maintain an adequate cardiac output will improve  Description: Ability to maintain an adequate cardiac output will improve  2/2/2022 1205 by Kale Briggs RN  Outcome: Ongoing  2/2/2022 0306 by Nedra Jacques RN  Outcome: Ongoing  Goal: Complications related to the disease process, condition or treatment will be avoided or minimized  Description: Complications related to the disease process, condition or treatment will be avoided or minimized  2/2/2022 1205 by Kale Briggs RN  Outcome: Ongoing  2/2/2022 0306 by Nedra Jacques RN  Outcome: Ongoing     Problem: Coping:  Goal: Level of anxiety will decrease  Description: Level of anxiety will decrease  2/2/2022 1205 by Kale Briggs RN  Outcome: Ongoing  2/2/2022 0306 by Nedra Jacques RN  Outcome: Met This Shift  Goal: General experience of comfort will improve  Description: General experience of comfort will improve  2/2/2022 1205 by Kale Briggs RN  Outcome: Ongoing  2/2/2022 0306 by Zechariah Singletary RN  Outcome: Met This Shift     Problem: Health Behavior:  Goal: Ability to manage health-related needs will improve  Description: Ability to manage health-related needs will improve  2/2/2022 1205 by Yi Doty RN  Outcome: Ongoing  2/2/2022 0306 by Zechariah Singletary RN  Outcome: Ongoing     Problem: Safety:  Goal: Ability to remain free from injury will improve  Description: Ability to remain free from injury will improve  2/2/2022 1205 by Yi Doty RN  Outcome: Ongoing  2/2/2022 0306 by Zechariah Singletary RN  Outcome: Met This Shift  Goal: Will show no signs and symptoms of excessive bleeding  Description: Will show no signs and symptoms of excessive bleeding  2/2/2022 1205 by Yi Doty RN  Outcome: Ongoing  2/2/2022 0306 by Zechariah Singletary RN  Outcome: Met This Shift

## 2022-02-02 NOTE — PROGRESS NOTES
Senior Note:    57-year-old male with past history of atrial fibrillation on Eliquis and recent diagnosis of pancreatic mass who presented to hospital for the management of atrial fibrillation with RVR. He is in A. fib with RVR at this point. No chest pain or shortness of breath. Troponins negative. proBNP elevated to 5300. Patient states that he presented to his GI doctor today for EUS for definitive diagnosis of pancreatic mass. Patient was noted to be in A. fib with RVR before the procedure was even started. He states that he has been off Eliquis for the past few days for EUS. Patient was recently diagnosed with a pancreatic mass on 1/13/2022 when he had a jaundice. He had ERCP requiring biliary stent at that time. His methimazole was put on hold during that. TSH is less than 0.01 and T4 is 3.77. No history of coronary artery disease. Assessment:  Atrial fibrillation with RVR  Recent diagnosis of a pancreatic mass, definitive diagnosis depending on further work-up. Recently had biliary stent placed due to jaundice from pancreatic mass. Hyperthyroidism    Plan:  Wean off of Cardizem drip. Start Lopressor 50 mg twice daily. We will see if need to add Cardizem on discharge along with Lopressor. Last stress test and echocardiogram are from 2017. Obtain 2D echocardiogram.  Continue to hold Eliquis at this point as GI is planning for inpatient EUS now. We will ask for cardiology evaluation for clearance as requested by GI. Will resume anticoagulation on discharge if no contraindication.

## 2022-02-02 NOTE — PROGRESS NOTES
[facility]  Internal Medicine Teaching Residency Program  Inpatient Daily Progress Note  ______________________________________________________________________________    Patient: Sparkle Altamirano  YOB: 1940   NSV:[id number]    Acct: [de-identified]     Room: [address] [place] [address]  Admit date: 2/1/2022  Today's date: 02/02/22  Number of days in the hospital: 1    SUBJECTIVE   Admitting Diagnosis: Atrial fibrillation with RVR ([facility] [address]  CC: afib with RVR seen on monitor  Pt examined at bedside. Chart & results reviewed. Tele today shows hr of 130s to 140s. Currently on amio and methimazole. Echo pending. No chest pain or palpitations. ROS:  Constitutional:  negative for chills, fevers, sweats  Respiratory:  negative for cough, dyspnea on exertion, hemoptysis, shortness of breath, wheezing  Cardiovascular:  negative for chest pain, chest pressure/discomfort, lower extremity edema, palpitations  Gastrointestinal:  negative for abdominal pain, constipation, diarrhea, nausea, vomiting  Neurological:  negative for dizziness, headache    BRIEF HISTORY     The patient is a pleasant 80 y.o. male presents with a chief complaint of a-fib with RVR seen on monitor. Patient was originally scheduled for EUS today with Dr. Josias Solano. When he was brought down, monitor showed that he had Afib with hr of 150. Patient denies any palpitations or CP at that time. Patient reports that this has happened before in the week prior. Notably about a month ago, patient was taken for ERCP due to pancreatic mass and had a metal stent placed in CBD . Patient was given lopressor and then switched dot cardizem gtt in the ED. Patient is currently rate controlled. No palpitations or CP present. Patient denies any fevers, chills, SOB, CP. Smokes 2 cigarettes a day, no alcohol, no illegal drugs. Labs remarkable for low TSH with eleveated free thyroxine. Patient currently does not take any thyroid medications. Currently HDS. On CT imaging show air in portal venous system but this is most likely due to recent biliary stent placement since abdomen is benign. OBJECTIVE     Vital Signs:  /62   Pulse 113   Temp 97.8 °F (36.6 °C) (Oral)   Resp 12   SpO2 96%     Temp (24hrs), Av.8 °F (36.6 °C), Min:97.8 °F (36.6 °C), Max:97.9 °F (36.6 °C)    No intake/output data recorded. Physical Exam:  Physical Exam  Constitutional:       General: He is not in acute distress. Appearance: Normal appearance. He is obese. He is not ill-appearing, toxic-appearing or diaphoretic. HENT:      Mouth/Throat:      Mouth: Mucous membranes are moist.      Pharynx: Oropharynx is clear. Eyes:      General: No scleral icterus. Cardiovascular:      Rate and Rhythm: Normal rate and regular rhythm. Pulses: Normal pulses. Heart sounds: Normal heart sounds. No murmur heard. No friction rub. No gallop. Pulmonary:      Effort: Pulmonary effort is normal.      Breath sounds: Normal breath sounds. Abdominal:      General: Abdomen is flat. Bowel sounds are normal. There is no distension. Palpations: Abdomen is soft. There is no mass. Tenderness: There is no abdominal tenderness. There is no guarding or rebound. Hernia: No hernia is present. Musculoskeletal:         General: No swelling, tenderness, deformity or signs of injury. Right lower leg: No edema. Left lower leg: No edema. Skin:     General: Skin is warm and dry. Coloration: Skin is not jaundiced or pale. Findings: No bruising. Neurological:      General: No focal deficit present. Mental Status: He is alert and oriented to person, place, and time. Cranial Nerves: No cranial nerve deficit. Motor: No weakness. Psychiatric:         Mood and Affect: Mood normal.         Behavior: Behavior normal.         Thought Content:  Thought content normal.         Judgment: Judgment normal.           Medications:  Scheduled Medications:    metoprolol tartrate  100 mg Oral BID    sodium chloride flush  5-40 mL IntraVENous 2 times per day    enoxaparin  40 mg SubCUTAneous Daily    methIMAzole  10 mg Oral BID     Continuous Infusions:    amiodarone 1 mg/min (02/02/22 0921)    Followed by   [facility] amiodarone      dilTIAZem Stopped (02/02/22 0908)    sodium chloride       PRN Medicationssodium chloride flush, 5-40 mL, PRN  sodium chloride, 25 mL, PRN  ondansetron, 4 mg, Q8H PRN   Or  ondansetron, 4 mg, Q6H PRN  polyethylene glycol, 17 g, Daily PRN  acetaminophen, 650 mg, Q6H PRN   Or  acetaminophen, 650 mg, Q6H PRN        Diagnostic Labs:  CBC:   Recent Labs     02/01/22  1012 02/02/22 0422   WBC 6.9 6.8   RBC 3.77* 3.68*   HGB 12.4* 12.0*   HCT 38.3* 36.8*   .6 100.0   RDW 15.3* 15.2*    163     BMP:   Recent Labs     02/01/22  1012 02/02/22 0422    137   K 4.2 4.0    108*   CO2 20 18*   BUN 13 12   CREATININE 0.60* 0.49*     BNP: No results for input(s): BNP in the last 72 hours. PT/INR:   Recent Labs     02/02/22 0422   PROTIME 12.6*   INR 1.2     APTT: No results for input(s): APTT in the last 72 hours. CARDIAC ENZYMES: No results for input(s): CKMB, CKMBINDEX, TROPONINI in the last 72 hours. Invalid input(s): CKTOTAL;3  FASTING LIPID PANEL:No results found for: CHOL, HDL, TRIG  LIVER PROFILE:   Recent Labs     02/01/22  1012 02/02/22 0422   AST 48* 43*   ALT 30 28   BILIDIR 1.43*  --    BILITOT 2.12* 1.89*   ALKPHOS 206* 193*      MICROBIOLOGY: No results found for: CULTURE    Imaging:    CT ABDOMEN PELVIS W IV CONTRAST Additional Contrast? None    Result Date: 2/1/2022  Findings consistent with pancreatic carcinoma. There is a biliary ductal stent in place which accounts for pneumobilia. RECOMMENDATIONS: Unavailable     CT CHEST PULMONARY EMBOLISM W CONTRAST    Result Date: 2/1/2022  1. No evidence for acute pulmonary embolism.  2. Patchy bilateral irregular ground-glass and more dense airspace densities noted with underlying reticular septal thickening. Imaging features can be seen with COVID pneumonia, though are nonspecific and can occur with a variety of infectious and noninfectious processes. 3. Findings indicative of portal venous gas. There is probably superimposed pneumobilia. Air-fluid level is seen gallbladder presumed partially imaged CBD. Dedicated CT abdomen pelvis with IV contrast and oral contrast is recommended. RECOMMENDATIONS: Unavailable       ASSESSMENT & PLAN     Assessment and Plan:    Principal Problem:    Atrial fibrillation with RVR (HCC)  Active Problems:    Pancreatic mass    History of ERCP    Hyperthyroidism  Resolved Problems:    * No resolved hospital problems. *    Afib with RVR  - continuous tele  - on amio gtt and had 250 digoxin ON  - f/u cardiac consult for clearance      Pancreatic mass  - stent placed 1 month ago  - will need EUS  - GI on board.  Will plan for EUS if hr is controlled   - AC and AP held  - NPO      Hyperthyroidism  - found to have low TSH of <0.01  - free thyroxine 3.77  - start methimazole      Hx of ERCP  - stent placed 1 month ago  - CT showed pneumobilia           Stella Velasquez MD  Internal Medicine Resident  [facility]  2/2/2022 11:02 AM

## 2022-02-02 NOTE — CONSULTS
Cardiovascular Consult Note     TODAY'S DATE: 2/2/2022    Patient name: Harjit Mendoza   YOB: 1940  Date of admission:  2/1/2022       Patient seen, examined. Previous clinical entries reviewed. All available laboratory, imaging and ancillary data reviewed. Reason for Consult: Atrial fibrillation    History of present Illness:     Harjit Mendoza is a 80 y.o. male with past history significant for non-ischemic cardiomyopathy with improved LVEF, atrial fibrillation and known pancreatic mass was here to undergo a endovascular US and went into atrial fibrillation with RVR and was admitted for further care. His heart rate is better controlled now. He denies any new chest pain or shortness of breath. He has mild to moderate chronic lower extremity edema. Past Medical History:    has a past medical history of A-fib ([address] Arthritis, Hypertension, Pancreatic mass, and Thyroid disease. Surgical History:     Past Surgical History:   Procedure Laterality Date    ERCP      HERNIA REPAIR         Medications:   Scheduled Meds:   metoprolol tartrate  100 mg Oral BID    sodium chloride flush  5-40 mL IntraVENous 2 times per day    methIMAzole  10 mg Oral BID     Continuous Infusions:   amiodarone 1 mg/min (02/02/22 0921)    Followed by   Yessy Current amiodarone      heparin (PORCINE) Infusion      dilTIAZem Stopped (02/02/22 0908)    sodium chloride        No outpatient medications have been marked as taking for the 2/1/22 encounter [facility]). Allergies:   Patient has no known allergies. Social History:    reports that he quit smoking about 2 months ago. He has never used smokeless tobacco. He reports previous alcohol use. He reports that he does not use drugs. Family History:    family history includes Arthritis in his father; Cancer in his father; [place] in his father; High Blood Pressure in his father and mother; Stroke in his mother.     Review of Systems: Constitutional: No fever/chills. Positive for fatigue. HENT: No headache, neck pain or neck stiffnes. No sore throat or dysphagia. Eyes: No blurred vision. Respiratory: As above. Cardiovascular: As above. Gastrointestinal: As above. . Genitourinary: Negative  Endocrine: Positive for history of thyrroid dysfunction. Musculoskeletal: Negative. Skin: Negative. Allergic/Immunologic: Negative. Neurologic: Negative. Hematological: Negative. Psychiatric: Negative. All other systems are are noted to be otherwise negative. Physical Exam:   /73   Pulse 112   Temp 98.2 °F (36.8 °C) (Oral)   Resp 16   SpO2 96%   No intake or output data in the 24 hours ending 02/02/22 1400    GENERAL:  Alert, appropriate, oriented, in NAD. HEENT:  Head is atraumatic and normocephalic. No Pallor. No icterus. NECK: Supple without any thyromegaly. LUNGS: Generally decreased breath sounds  CARDIAC: S1, S2, Irregular rhythm. ABD:  Soft non-tender . EXT: Positive for edema. MS: No obvious deformities. SKIN: No obvious skin rashes. NEURO: No focal neurologic deficits. Labs/ Ancillary data:     CBC:   Recent Labs     02/01/22 1012 02/02/22 0422   WBC 6.9 6.8   HGB 12.4* 12.0*    163     BMP:    Recent Labs     02/01/22 1012 02/02/22 0422    137   K 4.2 4.0    108*   CO2 20 18*   BUN 13 12   CREATININE 0.60* 0.49*   GLUCOSE 118* 86     Hepatic:   Recent Labs     02/01/22 1012 02/02/22 0422   AST 48* 43*   ALT 30 28   BILITOT 2.12* 1.89*   ALKPHOS 206* 193*     Troponin:   Recent Labs     02/01/22 1012   TROPONINT NOT REPORTED     INR:   Recent Labs     02/02/22 0422   INR 1.2       Impression :     Atrial fibrillation - rate better controlled. Non-ischemic cardiomyopathy with normalized LVEF. Chronic diastolic heart failure. Pancreatic mass. Plan :     Gentle diuresis as tolerated. Increase Metoprolol as tolerated for better heart rate control.   Avoid diltiazem due to LV dysfunction. Will follow. Thank you very much for allowing us to participate in the care of this patient. Please call us with any questions.       Electronically signed by Marciano Tinsley MD on 2/2/2022 at 2:01 PM

## 2022-02-02 NOTE — PLAN OF CARE
Problem: Coping:  Goal: Level of anxiety will decrease  Description: Level of anxiety will decrease  Outcome: Met This Shift  Goal: General experience of comfort will improve  Description: General experience of comfort will improve  Outcome: Met This Shift     Problem: Safety:  Goal: Ability to remain free from injury will improve  Description: Ability to remain free from injury will improve  Outcome: Met This Shift  Goal: Will show no signs and symptoms of excessive bleeding  Description: Will show no signs and symptoms of excessive bleeding  Outcome: Met This Shift     Problem:  Activity:  Goal: Ability to tolerate increased activity will improve  Description: Ability to tolerate increased activity will improve  Outcome: Ongoing  Goal: Expression of feelings of increased energy will increase  Description: Expression of feelings of increased energy will increase  Outcome: Ongoing     Problem: Cardiac:  Goal: Ability to maintain an adequate cardiac output will improve  Description: Ability to maintain an adequate cardiac output will improve  Outcome: Ongoing  Goal: Complications related to the disease process, condition or treatment will be avoided or minimized  Description: Complications related to the disease process, condition or treatment will be avoided or minimized  Outcome: Ongoing     Problem: Health Behavior:  Goal: Ability to manage health-related needs will improve  Description: Ability to manage health-related needs will improve  Outcome: Ongoing

## 2022-02-02 NOTE — CONSULTS
Attestation signed by      Attending Physician Statement:    I have discussed the care of  Ozzy March , including pertinent history and exam findings, with the Cardiology fellow/resident. I have seen and examined the patient and the key elements of all parts of the encounter have been performed by me. I agree with the assessment, plan and orders as documented by the fellow/resident, after I modified exam findings and plan of treatments, and the final version is my approved version of the assessment. Additional Comments: [facility]    Consult / H&P               Today's Date: 2/2/2022  Patient Name: Ozzy March  Date of admission: 2/1/2022  9:55 AM  Patient's age: 80 y.o., 1940  Admission Dx: Atrial fibrillation with RVR ([facility] [address] [I48.91]    Reason for Consult:  Cardiac evaluation    Requesting Physician: Brea Tracy MD    CHIEF COMPLAINT:  Atrial Fibrillation with RVR     History Obtained From:  patient, electronic medical record    HISTORY OF PRESENT ILLNESS:      The patient is a 80 y.o. with a chief complaint of atrial fibrillation with rapid ventricular rate seen on monitor; patient was recently scheduled to have endoscopic ultrasound with Dr. Kaur Last today when he was brought down telemetry was indicative of this new onset arrhythmia. He denies any chest pain and palpitation at this time. This is not the first recurrence of this rhythm as the happened approximately 1 week prior. Of note about a month ago he was taken to have ERCP for a metal stent in the common bile duct secondary to pancreatic mass. In the ED he was given Lopressor with transition to Cardizem drip and is currently rate controlled. He denies any other complaints but his TSH was low while his free T4 was noted to be high; patient is currently not on any thyroid medications.     Past Medical History:   has a past medical history of A-fib ([address] Arthritis, Hypertension, Pancreatic mass, and Thyroid disease. Past Surgical History:   has a past surgical history that includes ERCP and hernia repair. Home Medications:    Prior to Admission medications    Medication Sig Start Date End Date Taking? Authorizing Provider   apixaban (ELIQUIS) 5 MG TABS tablet Take 5 mg by mouth 2 times daily 11/23/21   Historical Provider, MD   magnesium oxide (MAG-OX) 400 MG tablet Take 400 mg by mouth daily 1/21/22   Historical Provider, MD   potassium chloride (KLOR-CON) 10 MEQ extended release tablet Take 10 mEq by mouth 2 times daily 1/21/22   Historical Provider, MD   ursodiol (ACTIGALL) 300 MG capsule Take 300 mg by mouth 2 times daily    Historical Provider, MD   metoprolol tartrate (LOPRESSOR) 50 MG tablet Take 50 mg by mouth 3 times daily    Historical Provider, MD      Current Facility-Administered Medications: [COMPLETED] dilTIAZem injection 10 mg, 10 mg, IntraVENous, Once **FOLLOWED BY** dilTIAZem 125 mg in dextrose 5 % 125 mL infusion, 5-15 mg/hr, IntraVENous, Continuous  sodium chloride flush 0.9 % injection 5-40 mL, 5-40 mL, IntraVENous, 2 times per day  sodium chloride flush 0.9 % injection 5-40 mL, 5-40 mL, IntraVENous, PRN  0.9 % sodium chloride infusion, 25 mL, IntraVENous, PRN  enoxaparin (LOVENOX) injection 40 mg, 40 mg, SubCUTAneous, Daily  ondansetron (ZOFRAN-ODT) disintegrating tablet 4 mg, 4 mg, Oral, Q8H PRN **OR** ondansetron (ZOFRAN) injection 4 mg, 4 mg, IntraVENous, Q6H PRN  polyethylene glycol (GLYCOLAX) packet 17 g, 17 g, Oral, Daily PRN  acetaminophen (TYLENOL) tablet 650 mg, 650 mg, Oral, Q6H PRN **OR** acetaminophen (TYLENOL) suppository 650 mg, 650 mg, Rectal, Q6H PRN  metoprolol tartrate (LOPRESSOR) tablet 50 mg, 50 mg, Oral, BID  [Held by provider] methIMAzole (TAPAZOLE) tablet 10 mg, 10 mg, Oral, BID    Allergies:  Patient has no known allergies. Social History:   reports that he quit smoking about 2 months ago. He has never used smokeless tobacco. He reports previous alcohol use.  He reports that he does not use drugs. Family History: family history includes Arthritis in his father; Cancer in his father; [place] in his father; High Blood Pressure in his father and mother; Stroke in his mother. REVIEW OF SYSTEMS:    Constitutional: Negative for chills, diaphoresis, fatigue and fever. Respiratory: Negative for apnea, cough, choking, shortness of breath, wheezing and stridor. Cardiovascular: Negative for chest pain and leg swelling. Gastrointestinal: Negative for abdominal distention, abdominal pain, blood in stool, constipation, diarrhea, nausea and vomiting. Genitourinary: Negative for decreased urine volume, dysuria and urgency. Skin: Negative for pallor and wound. Neurological: Negative for syncope, weakness, numbness and headaches. Psychiatric/Behavioral: Negative for agitation, behavioral problems, confusion, decreased concentration and self-injury. PHYSICAL EXAM:      /62   Pulse 113   Temp 97.8 °F (36.6 °C) (Oral)   Resp 12   SpO2 96%    Constitutional:       General: He is not in acute distress. Appearance: Normal appearance. He is obese. He is not ill-appearing, toxic-appearing or diaphoretic. HENT:      Mouth/Throat:      Mouth: Mucous membranes are moist.      Pharynx: Oropharynx is clear. Eyes:      General: No scleral icterus. Cardiovascular:      Rate and Rhythm: Normal rate and regular rhythm. Pulses: Normal pulses. Heart sounds: Normal heart sounds. No murmur heard. No friction rub. No gallop. Pulmonary:      Effort: Pulmonary effort is normal.      Breath sounds: Normal breath sounds. Abdominal:      General: Abdomen is flat. Bowel sounds are normal. There is no distension. Palpations: Abdomen is soft. There is no mass. Tenderness: There is no abdominal tenderness. There is no guarding or rebound. Hernia: No hernia is present.    Musculoskeletal:         General: No swelling, tenderness, deformity or signs of injury. Right lower leg: No edema. Left lower leg: No edema. Skin:     General: Skin is warm and dry. Coloration: Skin is not jaundiced or pale. Findings: No bruising. Neurological:      General: No focal deficit present. Mental Status: He is alert and oriented to person, place, and time. Cranial Nerves: No cranial nerve deficit. Motor: No weakness. Psychiatric:         Mood and Affect: Mood normal.         Behavior: Behavior normal.         Thought Content: Thought content normal.         Judgment: Judgment normal.     DATA:    Diagnostics:    EKG:  Results from ED evaluation pending - no abnormalities noted per sign out     ECHO:  Ordered and pending     6/6/2017 - Normal left ventricular end-diastolic dimension. Normal left ventricular   wall thickness. The estimated left ventricular ejection fraction is 55 -   60%. Normal left ventricular ejection fraction    Stress Test:  Ana Stress Test - 6/7/2017  There is a diaphragmatic attenuation artifact in the inferior wall.  No definite scintigraphic evidence of stress induced myocardial ischemia or myocardial infarction.  No wall motion abnormality is identified.  Calculated left ventricular ejection fraction is 67%.  The post test probability of the risk of the myocardial ischemia is low. Labs:   CBC:   Recent Labs     02/01/22  1012 02/02/22 0422   WBC 6.9 6.8   HGB 12.4* 12.0*   HCT 38.3* 36.8*    163     BMP:   Recent Labs     02/01/22  1012 02/02/22 0422    137   K 4.2 4.0   CO2 20 18*   BUN 13 12   CREATININE 0.60* 0.49*   LABGLOM >60 >60   GLUCOSE 118* 86     BNP: No results for input(s): BNP in the last 72 hours. PT/INR:   Recent Labs     02/02/22 0422   PROTIME 12.6*   INR 1.2     APTT:No results for input(s): APTT in the last 72 hours. CARDIAC ENZYMES:No results for input(s): CKTOTAL, CKMB, CKMBINDEX, TROPONINI in the last 72 hours.   FASTING LIPID PANEL:No results found for: HDL, LDLDIRECT, LDLCALC, TRIG  LIVER PROFILE:  Recent Labs     02/01/22  1012 02/02/22  0422   AST 48* 43*   ALT 30 28   LABALBU 2.3* 2.1*     IMPRESSION:    Patient Active Problem List   Diagnosis    Atrial fibrillation with RVR (HCC)    Pancreatic mass    History of ERCP    Hyperthyroidism     RECOMMENDATIONS:  1. Wean Cardizem gtt as tolerated with transition to home dose of Lopressor - CHADS VASC score of 3 and HAS BLED Score of 2  2. Clear for resumption of Eliquis as indicated by GI Post Procedure  3. RCRI Score of 0 - Class 1 Risk - Clear for procedure from Cardiology standpoint  4. Agree with Echo order. Will follow up on the results. 5. Further management per Primary  6. Will follow. Thank you for consultation. 7. Further recommendations after rounding with attending Dr. Vincent Blanco. Quentin Smart MD  PGY-2, Internal Medicine Resident  [address] [facility]  2/2/2022 6:35 [address] [place] [address] [facility]      [phone]

## 2022-02-03 ENCOUNTER — ANESTHESIA EVENT (OUTPATIENT)
Dept: OPERATING ROOM | Age: 82
DRG: 308 | End: 2022-02-03
Payer: MEDICARE

## 2022-02-03 LAB
ABSOLUTE EOS #: 0.14 K/UL (ref 0–0.44)
ABSOLUTE IMMATURE GRANULOCYTE: <0.03 K/UL (ref 0–0.3)
ABSOLUTE LYMPH #: 0.79 K/UL (ref 1.1–3.7)
ABSOLUTE MONO #: 0.39 K/UL (ref 0.1–1.2)
ALBUMIN SERPL-MCNC: 2.1 G/DL (ref 3.5–5.2)
ALBUMIN/GLOBULIN RATIO: 0.8 (ref 1–2.5)
ALP BLD-CCNC: 170 U/L (ref 40–129)
ALT SERPL-CCNC: 24 U/L (ref 5–41)
ANION GAP SERPL CALCULATED.3IONS-SCNC: 9 MMOL/L (ref 9–17)
AST SERPL-CCNC: 29 U/L
BASOPHILS # BLD: 0 % (ref 0–2)
BASOPHILS ABSOLUTE: <0.03 K/UL (ref 0–0.2)
BILIRUB SERPL-MCNC: 1.83 MG/DL (ref 0.3–1.2)
BUN BLDV-MCNC: 10 MG/DL (ref 8–23)
BUN/CREAT BLD: ABNORMAL (ref 9–20)
CALCIUM IONIZED: 1.11 MMOL/L (ref 1.13–1.33)
CALCIUM SERPL-MCNC: 8 MG/DL (ref 8.6–10.4)
CHLORIDE BLD-SCNC: 100 MMOL/L (ref 98–107)
CO2: 23 MMOL/L (ref 20–31)
CREAT SERPL-MCNC: 0.59 MG/DL (ref 0.7–1.2)
DIFFERENTIAL TYPE: ABNORMAL
EOSINOPHILS RELATIVE PERCENT: 3 % (ref 1–4)
GFR AFRICAN AMERICAN: >60 ML/MIN
GFR NON-AFRICAN AMERICAN: >60 ML/MIN
GFR SERPL CREATININE-BSD FRML MDRD: ABNORMAL ML/MIN/{1.73_M2}
GFR SERPL CREATININE-BSD FRML MDRD: ABNORMAL ML/MIN/{1.73_M2}
GLUCOSE BLD-MCNC: 108 MG/DL (ref 70–99)
HCT VFR BLD CALC: 37 % (ref 40.7–50.3)
HEMOGLOBIN: 12.2 G/DL (ref 13–17)
IMMATURE GRANULOCYTES: 0 %
LEGIONELLA PNEUMOPHILIA AG, URINE: NEGATIVE
LYMPHOCYTES # BLD: 16 % (ref 24–43)
MAGNESIUM: 1.6 MG/DL (ref 1.6–2.6)
MCH RBC QN AUTO: 32.6 PG (ref 25.2–33.5)
MCHC RBC AUTO-ENTMCNC: 33 G/DL (ref 28.4–34.8)
MCV RBC AUTO: 98.9 FL (ref 82.6–102.9)
MONOCYTES # BLD: 8 % (ref 3–12)
NRBC AUTOMATED: 0 PER 100 WBC
PARTIAL THROMBOPLASTIN TIME: 47.7 SEC (ref 20.5–30.5)
PARTIAL THROMBOPLASTIN TIME: 56.8 SEC (ref 20.5–30.5)
PARTIAL THROMBOPLASTIN TIME: 57.6 SEC (ref 20.5–30.5)
PARTIAL THROMBOPLASTIN TIME: 82.8 SEC (ref 20.5–30.5)
PDW BLD-RTO: 14.8 % (ref 11.8–14.4)
PLATELET # BLD: 165 K/UL (ref 138–453)
PLATELET ESTIMATE: ABNORMAL
PMV BLD AUTO: 10.4 FL (ref 8.1–13.5)
POTASSIUM SERPL-SCNC: 3 MMOL/L (ref 3.7–5.3)
PROCALCITONIN: 0.14 NG/ML
RBC # BLD: 3.74 M/UL (ref 4.21–5.77)
RBC # BLD: ABNORMAL 10*6/UL
SEG NEUTROPHILS: 73 % (ref 36–65)
SEGMENTED NEUTROPHILS ABSOLUTE COUNT: 3.73 K/UL (ref 1.5–8.1)
SODIUM BLD-SCNC: 132 MMOL/L (ref 135–144)
SOURCE: NORMAL
STREP PNEUMONIAE ANTIGEN: NEGATIVE
TOTAL PROTEIN: 4.7 G/DL (ref 6.4–8.3)
WBC # BLD: 5.1 K/UL (ref 3.5–11.3)
WBC # BLD: ABNORMAL 10*3/UL

## 2022-02-03 PROCEDURE — 6360000002 HC RX W HCPCS: Performed by: STUDENT IN AN ORGANIZED HEALTH CARE EDUCATION/TRAINING PROGRAM

## 2022-02-03 PROCEDURE — 2580000003 HC RX 258: Performed by: STUDENT IN AN ORGANIZED HEALTH CARE EDUCATION/TRAINING PROGRAM

## 2022-02-03 PROCEDURE — 6370000000 HC RX 637 (ALT 250 FOR IP): Performed by: INTERNAL MEDICINE

## 2022-02-03 PROCEDURE — 87449 NOS EACH ORGANISM AG IA: CPT

## 2022-02-03 PROCEDURE — 93005 ELECTROCARDIOGRAM TRACING: CPT | Performed by: INTERNAL MEDICINE

## 2022-02-03 PROCEDURE — 87040 BLOOD CULTURE FOR BACTERIA: CPT

## 2022-02-03 PROCEDURE — 82330 ASSAY OF CALCIUM: CPT

## 2022-02-03 PROCEDURE — 6370000000 HC RX 637 (ALT 250 FOR IP): Performed by: STUDENT IN AN ORGANIZED HEALTH CARE EDUCATION/TRAINING PROGRAM

## 2022-02-03 PROCEDURE — 85730 THROMBOPLASTIN TIME PARTIAL: CPT

## 2022-02-03 PROCEDURE — 99232 SBSQ HOSP IP/OBS MODERATE 35: CPT | Performed by: INTERNAL MEDICINE

## 2022-02-03 PROCEDURE — 87899 AGENT NOS ASSAY W/OPTIC: CPT

## 2022-02-03 PROCEDURE — 6360000002 HC RX W HCPCS: Performed by: INTERNAL MEDICINE

## 2022-02-03 PROCEDURE — 84145 PROCALCITONIN (PCT): CPT

## 2022-02-03 PROCEDURE — 80053 COMPREHEN METABOLIC PANEL: CPT

## 2022-02-03 PROCEDURE — 85025 COMPLETE CBC W/AUTO DIFF WBC: CPT

## 2022-02-03 PROCEDURE — 83735 ASSAY OF MAGNESIUM: CPT

## 2022-02-03 PROCEDURE — 36415 COLL VENOUS BLD VENIPUNCTURE: CPT

## 2022-02-03 PROCEDURE — 2060000000 HC ICU INTERMEDIATE R&B

## 2022-02-03 PROCEDURE — 86738 MYCOPLASMA ANTIBODY: CPT

## 2022-02-03 RX ORDER — POTASSIUM CHLORIDE 20 MEQ/1
20 TABLET, EXTENDED RELEASE ORAL 2 TIMES DAILY WITH MEALS
Status: DISCONTINUED | OUTPATIENT
Start: 2022-02-03 | End: 2022-02-03

## 2022-02-03 RX ORDER — MAGNESIUM SULFATE 1 G/100ML
1000 INJECTION INTRAVENOUS
Status: COMPLETED | OUTPATIENT
Start: 2022-02-03 | End: 2022-02-03

## 2022-02-03 RX ORDER — MAGNESIUM SULFATE IN WATER 40 MG/ML
2000 INJECTION, SOLUTION INTRAVENOUS ONCE
Status: DISCONTINUED | OUTPATIENT
Start: 2022-02-03 | End: 2022-02-03 | Stop reason: CLARIF

## 2022-02-03 RX ORDER — POTASSIUM CHLORIDE 20 MEQ/1
20 TABLET, EXTENDED RELEASE ORAL ONCE
Status: COMPLETED | OUTPATIENT
Start: 2022-02-03 | End: 2022-02-03

## 2022-02-03 RX ADMIN — METOPROLOL TARTRATE 100 MG: 50 TABLET, FILM COATED ORAL at 21:18

## 2022-02-03 RX ADMIN — HEPARIN SODIUM 1860 UNITS: 1000 INJECTION INTRAVENOUS; SUBCUTANEOUS at 03:18

## 2022-02-03 RX ADMIN — POTASSIUM CHLORIDE 20 MEQ: 1500 TABLET, EXTENDED RELEASE ORAL at 10:00

## 2022-02-03 RX ADMIN — AMIODARONE HYDROCHLORIDE 0.5 MG/MIN: 50 INJECTION, SOLUTION INTRAVENOUS at 12:06

## 2022-02-03 RX ADMIN — HEPARIN SODIUM 14 UNITS/KG/HR: 5000 INJECTION INTRAVENOUS; SUBCUTANEOUS at 18:36

## 2022-02-03 RX ADMIN — MAGNESIUM SULFATE HEPTAHYDRATE 1000 MG: 1 INJECTION, SOLUTION INTRAVENOUS at 11:05

## 2022-02-03 RX ADMIN — METHIMAZOLE 10 MG: 5 TABLET ORAL at 21:18

## 2022-02-03 RX ADMIN — POTASSIUM CHLORIDE 20 MEQ: 1500 TABLET, EXTENDED RELEASE ORAL at 08:22

## 2022-02-03 RX ADMIN — MAGNESIUM SULFATE HEPTAHYDRATE 1000 MG: 1 INJECTION, SOLUTION INTRAVENOUS at 09:58

## 2022-02-03 RX ADMIN — METOPROLOL TARTRATE 100 MG: 50 TABLET, FILM COATED ORAL at 08:22

## 2022-02-03 RX ADMIN — SODIUM CHLORIDE, PRESERVATIVE FREE 10 ML: 5 INJECTION INTRAVENOUS at 08:00

## 2022-02-03 RX ADMIN — SODIUM CHLORIDE, PRESERVATIVE FREE 10 ML: 5 INJECTION INTRAVENOUS at 21:20

## 2022-02-03 RX ADMIN — METHIMAZOLE 10 MG: 5 TABLET ORAL at 08:22

## 2022-02-03 RX ADMIN — FUROSEMIDE 20 MG: 10 INJECTION, SOLUTION INTRAVENOUS at 08:21

## 2022-02-03 ASSESSMENT — PAIN SCALES - GENERAL
PAINLEVEL_OUTOF10: 0

## 2022-02-03 NOTE — PROGRESS NOTES
[facility]  Internal Medicine Teaching Residency Program  Inpatient Daily Progress Note  ______________________________________________________________________________    Patient: Billy Hill  YOB: 1940   M:[id number]    Acct: [de-identified]     Room: [address] [place] [address]  Admit date: 2/1/2022  Today's date: 02/03/22  Number of days in the hospital: 2    SUBJECTIVE   Admitting Diagnosis: Atrial fibrillation with RVR ([facility]  CC: afib with RVR seen on monitor  Pt examined at bedside. Chart & results reviewed. HR is doing better. Now at low 100s. No CP or palpitations ON. Cardiology following. Febrile yesterday. Will f/u pan culture       ROS:  Constitutional:  negative for chills, fevers, sweats  Respiratory:  negative for cough, dyspnea on exertion, hemoptysis, shortness of breath, wheezing  Cardiovascular:  negative for chest pain, chest pressure/discomfort, lower extremity edema, palpitations  Gastrointestinal:  negative for abdominal pain, constipation, diarrhea, nausea, vomiting  Neurological:  negative for dizziness, headache    BRIEF HISTORY     The patient is a pleasant 80 y.o. male presents with a chief complaint of a-fib with RVR seen on monitor. Patient was originally scheduled for EUS today with Dr. Ronnie Perales. When he was brought down, monitor showed that he had Afib with hr of 150. Patient denies any palpitations or CP at that time. Patient reports that this has happened before in the week prior. Notably about a month ago, patient was taken for ERCP due to pancreatic mass and had a metal stent placed in CBD . Patient was given lopressor and then switched dot cardizem gtt in the ED. Patient is currently rate controlled. No palpitations or CP present. Patient denies any fevers, chills, SOB, CP. Smokes 2 cigarettes a day, no alcohol, no illegal drugs. Labs remarkable for low TSH with eleveated free thyroxine.  Patient currently does not take any thyroid medications. Currently HDS. On CT imaging show air in portal venous system but this is most likely due to recent biliary stent placement since abdomen is benign. OBJECTIVE     Vital Signs:  /73   Pulse 99   Temp 99.2 °F (37.3 °C) (Oral)   Resp 14   SpO2 96%     Temp (24hrs), Av.3 °F (37.4 °C), Min:98.2 °F (36.8 °C), Max:100.8 °F (38.2 °C)    In: -   Out: 1050 [Urine:1050]    Physical Exam:  Physical Exam  Constitutional:       General: He is not in acute distress. Appearance: Normal appearance. He is normal weight. He is not ill-appearing, toxic-appearing or diaphoretic. HENT:      Mouth/Throat:      Mouth: Mucous membranes are moist.      Pharynx: Oropharynx is clear. Eyes:      General: No scleral icterus. Cardiovascular:      Rate and Rhythm: Normal rate and regular rhythm. Pulses: Normal pulses. Heart sounds: Normal heart sounds. No murmur heard. No friction rub. No gallop. Pulmonary:      Effort: Pulmonary effort is normal.      Breath sounds: Normal breath sounds. Abdominal:      General: Abdomen is flat. Bowel sounds are normal. There is no distension. Palpations: Abdomen is soft. There is no mass. Tenderness: There is no abdominal tenderness. There is no guarding or rebound. Hernia: No hernia is present. Musculoskeletal:         General: No swelling, tenderness, deformity or signs of injury. Right lower leg: No edema. Left lower leg: No edema. Skin:     General: Skin is warm and dry. Coloration: Skin is not jaundiced or pale. Findings: No bruising. Neurological:      General: No focal deficit present. Mental Status: He is alert and oriented to person, place, and time. Cranial Nerves: No cranial nerve deficit. Motor: No weakness. Psychiatric:         Mood and Affect: Mood normal.         Behavior: Behavior normal.         Thought Content:  Thought content normal.         Judgment: Judgment normal. Medications:  Scheduled Medications:    potassium chloride  20 mEq Oral BID WC    magnesium sulfate  1,000 mg IntraVENous Q1H    metoprolol tartrate  100 mg Oral BID    furosemide  20 mg IntraVENous Daily    sodium chloride flush  5-40 mL IntraVENous 2 times per day    methIMAzole  10 mg Oral BID     Continuous Infusions:    amiodarone 0.5 mg/min (02/02/22 2018)    heparin (PORCINE) Infusion 16 Units/kg/hr (02/03/22 0317)    sodium chloride       PRN Medicationsheparin (porcine), 60 Units/kg, PRN  heparin (porcine), 30 Units/kg, PRN  sodium chloride flush, 5-40 mL, PRN  sodium chloride, 25 mL, PRN  ondansetron, 4 mg, Q8H PRN   Or  ondansetron, 4 mg, Q6H PRN  polyethylene glycol, 17 g, Daily PRN  acetaminophen, 650 mg, Q6H PRN   Or  acetaminophen, 650 mg, Q6H PRN        Diagnostic Labs:  CBC:   Recent Labs     02/01/22  1012 02/02/22  0422 02/03/22  0202   WBC 6.9 6.8 5.1   RBC 3.77* 3.68* 3.74*   HGB 12.4* 12.0* 12.2*   HCT 38.3* 36.8* 37.0*   .6 100.0 98.9   RDW 15.3* 15.2* 14.8*    163 165     BMP:   Recent Labs     02/01/22  1012 02/02/22  0422 02/03/22  0202    137 132*   K 4.2 4.0 3.0*    108* 100   CO2 20 18* 23   BUN 13 12 10   CREATININE 0.60* 0.49* 0.59*     BNP: No results for input(s): BNP in the last 72 hours. PT/INR:   Recent Labs     02/02/22 0422   PROTIME 12.6*   INR 1.2     APTT:   Recent Labs     02/02/22  1220 02/02/22  1935 02/03/22 0202   APTT 26.0 34.1* 47.7*     CARDIAC ENZYMES: No results for input(s): CKMB, CKMBINDEX, TROPONINI in the last 72 hours.     Invalid input(s): CKTOTAL;3  FASTING LIPID PANEL:No results found for: CHOL, HDL, TRIG  LIVER PROFILE:   Recent Labs     02/01/22  1012 02/02/22  0422 02/03/22  0202   AST 48* 43* 29   ALT 30 28 24   BILIDIR 1.43*  --   --    BILITOT 2.12* 1.89* 1.83*   ALKPHOS 206* 193* 170*      MICROBIOLOGY: No results found for: CULTURE    Imaging:    CT ABDOMEN PELVIS W IV CONTRAST Additional Contrast? None    Result Date: 2/1/2022  Findings consistent with pancreatic carcinoma. There is a biliary ductal stent in place which accounts for pneumobilia. RECOMMENDATIONS: Unavailable     CT CHEST PULMONARY EMBOLISM W CONTRAST    Result Date: 2/1/2022  1. No evidence for acute pulmonary embolism. 2. Patchy bilateral irregular ground-glass and more dense airspace densities noted with underlying reticular septal thickening. Imaging features can be seen with COVID pneumonia, though are nonspecific and can occur with a variety of infectious and noninfectious processes. 3. Findings indicative of portal venous gas. There is probably superimposed pneumobilia. Air-fluid level is seen gallbladder presumed partially imaged CBD. Dedicated CT abdomen pelvis with IV contrast and oral contrast is recommended. RECOMMENDATIONS: Unavailable       ASSESSMENT & PLAN     Assessment and Plan:    Principal Problem:    Atrial fibrillation with RVR (HCC)  Active Problems:    Pancreatic mass    History of ERCP    Hyperthyroidism  Resolved Problems:    * No resolved hospital problems. *    Afib with RVR  - continuous tele  - on amio gtt and had 250 digoxin   - f/u cardiac consult for clearance      Pancreatic mass  - stent placed 1 month ago  - will need EUS  - GI on board.  Will plan for EUS if hr is controlled   - AC and AP held  - NPO      Hyperthyroidism  - found to have low TSH of <0.01  - free thyroxine 3.77  - start methimazole      Hx of ERCP  - stent placed 1 month ago  - CT showed pneumobilia    Hypokalemia  - 3.0 today, will replace            Elan Winter MD  Internal Medicine Resident  [address] [facility]  2/3/2022 9:15 AM

## 2022-02-03 NOTE — PLAN OF CARE
Problem: Skin Integrity:  Goal: Will show no infection signs and symptoms  Description: Will show no infection signs and symptoms  Outcome: Met This Shift  Goal: Absence of new skin breakdown  Description: Absence of new skin breakdown  Outcome: Met This Shift     Problem: Falls - Risk of:  Goal: Absence of physical injury  Description: Absence of physical injury  Outcome: Met This Shift     Problem: Coping:  Goal: Level of anxiety will decrease  Description: Level of anxiety will decrease  Outcome: Met This Shift  Goal: General experience of comfort will improve  Description: General experience of comfort will improve  Outcome: Met This Shift     Problem: Health Behavior:  Goal: Ability to manage health-related needs will improve  Description: Ability to manage health-related needs will improve  Outcome: Met This Shift     Problem: Safety:  Goal: Ability to remain free from injury will improve  Description: Ability to remain free from injury will improve  Outcome: Met This Shift  Goal: Will show no signs and symptoms of excessive bleeding  Description: Will show no signs and symptoms of excessive bleeding  Outcome: Met This Shift     Problem:  Activity:  Goal: Ability to tolerate increased activity will improve  Description: Ability to tolerate increased activity will improve  Outcome: Ongoing  Goal: Expression of feelings of increased energy will increase  Description: Expression of feelings of increased energy will increase  Outcome: Ongoing     Problem: Cardiac:  Goal: Ability to maintain an adequate cardiac output will improve  Description: Ability to maintain an adequate cardiac output will improve  Outcome: Ongoing  Goal: Complications related to the disease process, condition or treatment will be avoided or minimized  Description: Complications related to the disease process, condition or treatment will be avoided or minimized  Outcome: Ongoing

## 2022-02-03 NOTE — PLAN OF CARE
Talk to Dr. Milagros Tinsley on phone about cardiology clearance for EUS for definitive diagnosis of pancreatic mass. Dr. Milagros Tinsley stated that patient is cleared from his side to have EUS done anytime. Talked to GI on phone and they will schedule the patient for EUS tomorrow. We will leave patient on amiodarone infusion for better rate control till he is done with the procedure. Continue heparin infusion and hold it 6 hours before the procedure, roughly at 4am tomorrow.  NPO midnight

## 2022-02-03 NOTE — PLAN OF CARE
GI update:    Notified per primary to cardiology states patient may have procedure completed tomorrow. We will attempt to schedule patient for EUS tomorrow around 10 AM-please hold heparin drip at 4 AM.   Patient is still on amiodarone drip and primary/cardiology would like patient to continue on amiodarone drip throughout the procedure as a means to control heart rate. We will reach out to Dr. Allan Briggs to verify his availability and approval for procedure under these conditions.    Patient will need to be n.p.o. at midnight  We will follow home please call with any questions      Cristopher Carpio, [facility]

## 2022-02-04 ENCOUNTER — ANESTHESIA (OUTPATIENT)
Dept: OPERATING ROOM | Age: 82
DRG: 308 | End: 2022-02-04
Payer: MEDICARE

## 2022-02-04 ENCOUNTER — APPOINTMENT (OUTPATIENT)
Dept: ULTRASOUND IMAGING | Age: 82
DRG: 308 | End: 2022-02-04
Payer: MEDICARE

## 2022-02-04 VITALS
RESPIRATION RATE: 16 BRPM | DIASTOLIC BLOOD PRESSURE: 75 MMHG | SYSTOLIC BLOOD PRESSURE: 103 MMHG | OXYGEN SATURATION: 100 %

## 2022-02-04 LAB
ABSOLUTE EOS #: 0.29 K/UL (ref 0–0.44)
ABSOLUTE IMMATURE GRANULOCYTE: 0 K/UL (ref 0–0.3)
ABSOLUTE LYMPH #: 1.06 K/UL (ref 1.1–3.7)
ABSOLUTE MONO #: 0.43 K/UL (ref 0.1–1.2)
ALBUMIN SERPL-MCNC: 1.9 G/DL (ref 3.5–5.2)
ALBUMIN/GLOBULIN RATIO: 0.7 (ref 1–2.5)
ALP BLD-CCNC: 167 U/L (ref 40–129)
ALT SERPL-CCNC: 22 U/L (ref 5–41)
ANION GAP SERPL CALCULATED.3IONS-SCNC: 10 MMOL/L (ref 9–17)
AST SERPL-CCNC: 29 U/L
BASOPHILS # BLD: 0 % (ref 0–2)
BASOPHILS ABSOLUTE: 0 K/UL (ref 0–0.2)
BILIRUB SERPL-MCNC: 1.76 MG/DL (ref 0.3–1.2)
BUN BLDV-MCNC: 14 MG/DL (ref 8–23)
BUN/CREAT BLD: ABNORMAL (ref 9–20)
CALCIUM SERPL-MCNC: 7.9 MG/DL (ref 8.6–10.4)
CHLORIDE BLD-SCNC: 104 MMOL/L (ref 98–107)
CO2: 21 MMOL/L (ref 20–31)
CREAT SERPL-MCNC: 0.74 MG/DL (ref 0.7–1.2)
DIFFERENTIAL TYPE: ABNORMAL
EKG ATRIAL RATE: 187 BPM
EKG Q-T INTERVAL: 310 MS
EKG QRS DURATION: 114 MS
EKG QTC CALCULATION (BAZETT): 489 MS
EKG R AXIS: 48 DEGREES
EKG T AXIS: -63 DEGREES
EKG VENTRICULAR RATE: 150 BPM
EOSINOPHILS RELATIVE PERCENT: 6 % (ref 1–4)
GFR AFRICAN AMERICAN: >60 ML/MIN
GFR NON-AFRICAN AMERICAN: >60 ML/MIN
GFR SERPL CREATININE-BSD FRML MDRD: ABNORMAL ML/MIN/{1.73_M2}
GFR SERPL CREATININE-BSD FRML MDRD: ABNORMAL ML/MIN/{1.73_M2}
GLUCOSE BLD-MCNC: 102 MG/DL (ref 70–99)
HCT VFR BLD CALC: 37.4 % (ref 40.7–50.3)
HEMOGLOBIN: 12.5 G/DL (ref 13–17)
IMMATURE GRANULOCYTES: 0 %
LYMPHOCYTES # BLD: 22 % (ref 24–43)
MAGNESIUM: 1.9 MG/DL (ref 1.6–2.6)
MCH RBC QN AUTO: 32.4 PG (ref 25.2–33.5)
MCHC RBC AUTO-ENTMCNC: 33.4 G/DL (ref 28.4–34.8)
MCV RBC AUTO: 96.9 FL (ref 82.6–102.9)
MONOCYTES # BLD: 9 % (ref 3–12)
MORPHOLOGY: ABNORMAL
MYCOPLASMA PNEUMONIAE IGM: 0.22
NRBC AUTOMATED: 0 PER 100 WBC
PARTIAL THROMBOPLASTIN TIME: 25.8 SEC (ref 20.5–30.5)
PDW BLD-RTO: 14.7 % (ref 11.8–14.4)
PLATELET # BLD: 173 K/UL (ref 138–453)
PLATELET ESTIMATE: ABNORMAL
PMV BLD AUTO: 10.6 FL (ref 8.1–13.5)
POTASSIUM SERPL-SCNC: 3.4 MMOL/L (ref 3.7–5.3)
RBC # BLD: 3.86 M/UL (ref 4.21–5.77)
RBC # BLD: ABNORMAL 10*6/UL
SEG NEUTROPHILS: 63 % (ref 36–65)
SEGMENTED NEUTROPHILS ABSOLUTE COUNT: 3.02 K/UL (ref 1.5–8.1)
SODIUM BLD-SCNC: 135 MMOL/L (ref 135–144)
TOTAL PROTEIN: 4.8 G/DL (ref 6.4–8.3)
WBC # BLD: 4.8 K/UL (ref 3.5–11.3)
WBC # BLD: ABNORMAL 10*3/UL

## 2022-02-04 PROCEDURE — 6360000002 HC RX W HCPCS: Performed by: STUDENT IN AN ORGANIZED HEALTH CARE EDUCATION/TRAINING PROGRAM

## 2022-02-04 PROCEDURE — 2709999900 HC NON-CHARGEABLE SUPPLY: Performed by: INTERNAL MEDICINE

## 2022-02-04 PROCEDURE — 3609012400 HC EGD TRANSORAL BIOPSY SINGLE/MULTIPLE: Performed by: INTERNAL MEDICINE

## 2022-02-04 PROCEDURE — 6370000000 HC RX 637 (ALT 250 FOR IP): Performed by: NURSE PRACTITIONER

## 2022-02-04 PROCEDURE — 2580000003 HC RX 258: Performed by: NURSE PRACTITIONER

## 2022-02-04 PROCEDURE — 99232 SBSQ HOSP IP/OBS MODERATE 35: CPT | Performed by: INTERNAL MEDICINE

## 2022-02-04 PROCEDURE — 6370000000 HC RX 637 (ALT 250 FOR IP): Performed by: INTERNAL MEDICINE

## 2022-02-04 PROCEDURE — 36415 COLL VENOUS BLD VENIPUNCTURE: CPT

## 2022-02-04 PROCEDURE — 3700000001 HC ADD 15 MINUTES (ANESTHESIA): Performed by: INTERNAL MEDICINE

## 2022-02-04 PROCEDURE — 88173 CYTOPATH EVAL FNA REPORT: CPT

## 2022-02-04 PROCEDURE — 83735 ASSAY OF MAGNESIUM: CPT

## 2022-02-04 PROCEDURE — 0FBG8ZX EXCISION OF PANCREAS, VIA NATURAL OR ARTIFICIAL OPENING ENDOSCOPIC, DIAGNOSTIC: ICD-10-PCS | Performed by: INTERNAL MEDICINE

## 2022-02-04 PROCEDURE — 76975 GI ENDOSCOPIC ULTRASOUND: CPT

## 2022-02-04 PROCEDURE — 2580000003 HC RX 258: Performed by: STUDENT IN AN ORGANIZED HEALTH CARE EDUCATION/TRAINING PROGRAM

## 2022-02-04 PROCEDURE — 6360000002 HC RX W HCPCS: Performed by: INTERNAL MEDICINE

## 2022-02-04 PROCEDURE — 7100000000 HC PACU RECOVERY - FIRST 15 MIN: Performed by: INTERNAL MEDICINE

## 2022-02-04 PROCEDURE — 80053 COMPREHEN METABOLIC PANEL: CPT

## 2022-02-04 PROCEDURE — 85025 COMPLETE CBC W/AUTO DIFF WBC: CPT

## 2022-02-04 PROCEDURE — 3609020800 HC EGD W/EUS FNA: Performed by: INTERNAL MEDICINE

## 2022-02-04 PROCEDURE — 2580000003 HC RX 258: Performed by: NURSE ANESTHETIST, CERTIFIED REGISTERED

## 2022-02-04 PROCEDURE — 88172 CYTP DX EVAL FNA 1ST EA SITE: CPT

## 2022-02-04 PROCEDURE — 88342 IMHCHEM/IMCYTCHM 1ST ANTB: CPT

## 2022-02-04 PROCEDURE — 88305 TISSUE EXAM BY PATHOLOGIST: CPT

## 2022-02-04 PROCEDURE — 3700000000 HC ANESTHESIA ATTENDED CARE: Performed by: INTERNAL MEDICINE

## 2022-02-04 PROCEDURE — 7100000001 HC PACU RECOVERY - ADDTL 15 MIN: Performed by: INTERNAL MEDICINE

## 2022-02-04 PROCEDURE — 0DB68ZX EXCISION OF STOMACH, VIA NATURAL OR ARTIFICIAL OPENING ENDOSCOPIC, DIAGNOSTIC: ICD-10-PCS | Performed by: INTERNAL MEDICINE

## 2022-02-04 PROCEDURE — 6360000002 HC RX W HCPCS: Performed by: NURSE PRACTITIONER

## 2022-02-04 PROCEDURE — 6360000002 HC RX W HCPCS: Performed by: NURSE ANESTHETIST, CERTIFIED REGISTERED

## 2022-02-04 PROCEDURE — 85730 THROMBOPLASTIN TIME PARTIAL: CPT

## 2022-02-04 PROCEDURE — 88177 CYTP FNA EVAL EA ADDL: CPT

## 2022-02-04 PROCEDURE — 2720000010 HC SURG SUPPLY STERILE: Performed by: INTERNAL MEDICINE

## 2022-02-04 PROCEDURE — 93010 ELECTROCARDIOGRAM REPORT: CPT | Performed by: INTERNAL MEDICINE

## 2022-02-04 PROCEDURE — 2500000003 HC RX 250 WO HCPCS: Performed by: NURSE ANESTHETIST, CERTIFIED REGISTERED

## 2022-02-04 PROCEDURE — C1713 ANCHOR/SCREW BN/BN,TIS/BN: HCPCS | Performed by: INTERNAL MEDICINE

## 2022-02-04 PROCEDURE — 2060000000 HC ICU INTERMEDIATE R&B

## 2022-02-04 RX ORDER — DIGOXIN 0.25 MG/ML
125 INJECTION INTRAMUSCULAR; INTRAVENOUS ONCE
Status: DISCONTINUED | OUTPATIENT
Start: 2022-02-04 | End: 2022-02-04

## 2022-02-04 RX ORDER — DIGOXIN 0.25 MG/ML
125 INJECTION INTRAMUSCULAR; INTRAVENOUS ONCE
Status: COMPLETED | OUTPATIENT
Start: 2022-02-04 | End: 2022-02-04

## 2022-02-04 RX ORDER — POTASSIUM CHLORIDE 7.45 MG/ML
10 INJECTION INTRAVENOUS
Status: DISCONTINUED | OUTPATIENT
Start: 2022-02-04 | End: 2022-02-04

## 2022-02-04 RX ORDER — POTASSIUM CHLORIDE 7.45 MG/ML
10 INJECTION INTRAVENOUS
Status: DISPENSED | OUTPATIENT
Start: 2022-02-04 | End: 2022-02-04

## 2022-02-04 RX ORDER — FENTANYL CITRATE 50 UG/ML
25 INJECTION, SOLUTION INTRAMUSCULAR; INTRAVENOUS EVERY 5 MIN PRN
Status: DISCONTINUED | OUTPATIENT
Start: 2022-02-04 | End: 2022-02-04 | Stop reason: HOSPADM

## 2022-02-04 RX ORDER — OXYCODONE HYDROCHLORIDE AND ACETAMINOPHEN 5; 325 MG/1; MG/1
1 TABLET ORAL PRN
Status: DISCONTINUED | OUTPATIENT
Start: 2022-02-04 | End: 2022-02-04 | Stop reason: HOSPADM

## 2022-02-04 RX ORDER — LABETALOL HYDROCHLORIDE 5 MG/ML
5 INJECTION, SOLUTION INTRAVENOUS EVERY 10 MIN PRN
Status: DISCONTINUED | OUTPATIENT
Start: 2022-02-04 | End: 2022-02-04 | Stop reason: HOSPADM

## 2022-02-04 RX ORDER — SODIUM CHLORIDE 9 MG/ML
INJECTION, SOLUTION INTRAVENOUS CONTINUOUS PRN
Status: DISCONTINUED | OUTPATIENT
Start: 2022-02-04 | End: 2022-02-04 | Stop reason: SDUPTHER

## 2022-02-04 RX ORDER — PROPOFOL 10 MG/ML
INJECTION, EMULSION INTRAVENOUS PRN
Status: DISCONTINUED | OUTPATIENT
Start: 2022-02-04 | End: 2022-02-04 | Stop reason: SDUPTHER

## 2022-02-04 RX ORDER — LIDOCAINE HYDROCHLORIDE 10 MG/ML
INJECTION, SOLUTION EPIDURAL; INFILTRATION; INTRACAUDAL; PERINEURAL PRN
Status: DISCONTINUED | OUTPATIENT
Start: 2022-02-04 | End: 2022-02-04 | Stop reason: SDUPTHER

## 2022-02-04 RX ORDER — DIGOXIN 0.25 MG/ML
250 INJECTION INTRAMUSCULAR; INTRAVENOUS ONCE
Status: COMPLETED | OUTPATIENT
Start: 2022-02-04 | End: 2022-02-04

## 2022-02-04 RX ORDER — MORPHINE SULFATE 2 MG/ML
2 INJECTION, SOLUTION INTRAMUSCULAR; INTRAVENOUS EVERY 5 MIN PRN
Status: DISCONTINUED | OUTPATIENT
Start: 2022-02-04 | End: 2022-02-04 | Stop reason: HOSPADM

## 2022-02-04 RX ORDER — METHIMAZOLE 5 MG/1
5 TABLET ORAL DAILY
Status: ON HOLD | COMMUNITY
End: 2022-02-06 | Stop reason: HOSPADM

## 2022-02-04 RX ORDER — ONDANSETRON 2 MG/ML
4 INJECTION INTRAMUSCULAR; INTRAVENOUS
Status: DISCONTINUED | OUTPATIENT
Start: 2022-02-04 | End: 2022-02-04 | Stop reason: HOSPADM

## 2022-02-04 RX ORDER — OXYCODONE HYDROCHLORIDE AND ACETAMINOPHEN 5; 325 MG/1; MG/1
2 TABLET ORAL PRN
Status: DISCONTINUED | OUTPATIENT
Start: 2022-02-04 | End: 2022-02-04 | Stop reason: HOSPADM

## 2022-02-04 RX ORDER — DIPHENHYDRAMINE HYDROCHLORIDE 50 MG/ML
12.5 INJECTION INTRAMUSCULAR; INTRAVENOUS
Status: DISCONTINUED | OUTPATIENT
Start: 2022-02-04 | End: 2022-02-04 | Stop reason: HOSPADM

## 2022-02-04 RX ADMIN — LIDOCAINE HYDROCHLORIDE 50 MG: 10 INJECTION, SOLUTION EPIDURAL; INFILTRATION; INTRACAUDAL; PERINEURAL at 10:56

## 2022-02-04 RX ADMIN — PHENYLEPHRINE HYDROCHLORIDE 100 MCG: 10 INJECTION INTRAVENOUS at 11:06

## 2022-02-04 RX ADMIN — FUROSEMIDE 20 MG: 10 INJECTION, SOLUTION INTRAVENOUS at 07:59

## 2022-02-04 RX ADMIN — PROPOFOL 50 MG: 10 INJECTION, EMULSION INTRAVENOUS at 10:56

## 2022-02-04 RX ADMIN — POTASSIUM CHLORIDE 10 MEQ: 7.46 INJECTION, SOLUTION INTRAVENOUS at 15:21

## 2022-02-04 RX ADMIN — DIGOXIN 125 MCG: 0.25 INJECTION INTRAMUSCULAR; INTRAVENOUS at 22:02

## 2022-02-04 RX ADMIN — SODIUM CHLORIDE, PRESERVATIVE FREE 10 ML: 5 INJECTION INTRAVENOUS at 07:59

## 2022-02-04 RX ADMIN — AMIODARONE HYDROCHLORIDE 0.5 MG/MIN: 50 INJECTION, SOLUTION INTRAVENOUS at 18:30

## 2022-02-04 RX ADMIN — SODIUM CHLORIDE: 9 INJECTION, SOLUTION INTRAVENOUS at 10:15

## 2022-02-04 RX ADMIN — PROPOFOL 100 MCG/KG/MIN: 10 INJECTION, EMULSION INTRAVENOUS at 10:57

## 2022-02-04 RX ADMIN — DIGOXIN 250 MCG: 0.25 INJECTION INTRAMUSCULAR; INTRAVENOUS at 15:20

## 2022-02-04 RX ADMIN — POTASSIUM CHLORIDE 10 MEQ: 7.46 INJECTION, SOLUTION INTRAVENOUS at 17:49

## 2022-02-04 RX ADMIN — POTASSIUM CHLORIDE 10 MEQ: 7.46 INJECTION, SOLUTION INTRAVENOUS at 08:00

## 2022-02-04 RX ADMIN — METHIMAZOLE 10 MG: 5 TABLET ORAL at 22:36

## 2022-02-04 RX ADMIN — METOPROLOL TARTRATE 100 MG: 50 TABLET, FILM COATED ORAL at 08:00

## 2022-02-04 RX ADMIN — PHENYLEPHRINE HYDROCHLORIDE 100 MCG: 10 INJECTION INTRAVENOUS at 11:00

## 2022-02-04 RX ADMIN — POTASSIUM CHLORIDE 10 MEQ: 7.46 INJECTION, SOLUTION INTRAVENOUS at 16:27

## 2022-02-04 RX ADMIN — METOPROLOL TARTRATE 100 MG: 50 TABLET, FILM COATED ORAL at 21:56

## 2022-02-04 RX ADMIN — SODIUM CHLORIDE, PRESERVATIVE FREE 10 ML: 5 INJECTION INTRAVENOUS at 21:55

## 2022-02-04 RX ADMIN — HEPARIN SODIUM 12 UNITS/KG/HR: 5000 INJECTION INTRAVENOUS; SUBCUTANEOUS at 22:10

## 2022-02-04 RX ADMIN — AMIODARONE HYDROCHLORIDE 0.5 MG/MIN: 50 INJECTION, SOLUTION INTRAVENOUS at 04:00

## 2022-02-04 RX ADMIN — METHIMAZOLE 10 MG: 5 TABLET ORAL at 07:58

## 2022-02-04 ASSESSMENT — PULMONARY FUNCTION TESTS
PIF_VALUE: 1
PIF_VALUE: 0
PIF_VALUE: 1
PIF_VALUE: 0
PIF_VALUE: 1
PIF_VALUE: 0
PIF_VALUE: 1
PIF_VALUE: 0
PIF_VALUE: 1
PIF_VALUE: 0
PIF_VALUE: 1
PIF_VALUE: 0
PIF_VALUE: 1
PIF_VALUE: 0

## 2022-02-04 ASSESSMENT — PAIN SCALES - GENERAL
PAINLEVEL_OUTOF10: 0

## 2022-02-04 ASSESSMENT — LIFESTYLE VARIABLES: SMOKING_STATUS: 1

## 2022-02-04 NOTE — PLAN OF CARE
GI update:    Dr. Ashli Hardy may be restarted on heparin drip at 1700 tonight. Soft diet   Patient will need to follow-up in the office with Dr. Karon Mathew as previously discussed no further recommendations.    At this time we will sign off    Caden Ivy, CNP

## 2022-02-04 NOTE — ANESTHESIA PRE PROCEDURE
Department of Anesthesiology  Preprocedure Note       Name:  Andrea Beltran   Age:  80 y.o.  :  1940                                          MRN:  [id number]         Date:  2022      Surgeon: Manpreet De Jesus):  Chris Alvarado MD    Procedure: Procedure(s):  ENDOSCOPIC ULTRASOUND WITH FNA - GI SCHEDULED    Department of Anesthesiology  Pre-Anesthesia Evaluation/Consultation         Name:  Andrea Beltran                                         Age:  80 y.o.   MRN:  [id number]             Medications  Current Facility-Administered Medications   Medication Dose Route Frequency Provider Last Rate Last Admin    amiodarone (CORDARONE) 450 mg in dextrose 5 % 250 mL infusion  0.5 mg/min IntraVENous Continuous Hammad Yoon MD 16.7 mL/hr at 22 0400 0.5 mg/min at 22 0400    metoprolol tartrate (LOPRESSOR) tablet 100 mg  100 mg Oral BID Triston Hardy MD   100 mg at 22    heparin (porcine) injection 3,730 Units  60 Units/kg IntraVENous PRN Bravo Rayo MD        heparin (porcine) injection 1,860 Units  30 Units/kg IntraVENous PRN Bravo Rayo MD   1,860 Units at 22 0318    heparin 25,000 units in dextrose 5% 250 mL (premix) infusion  5-30 Units/kg/hr IntraVENous Continuous Bravo Rayo MD   Stopped at 22 0400    furosemide (LASIX) injection 20 mg  20 mg IntraVENous Daily Triston Hardy MD   20 mg at 22 1756    sodium chloride flush 0.9 % injection 5-40 mL  5-40 mL IntraVENous 2 times per day Riley Hernandez MD   10 mL at 220    sodium chloride flush 0.9 % injection 5-40 mL  5-40 mL IntraVENous PRN Riley Hernandez MD        0.9 % sodium chloride infusion  25 mL IntraVENous PRN Riley Hernandez MD        ondansetron (ZOFRAN-ODT) disintegrating tablet 4 mg  4 mg Oral Q8H PRN Riley Hernandez MD        Or    ondansetron [facility]) injection 4 mg  4 mg IntraVENous Q6H PRN Riley Hernandez MD        polyethylene glycol (GLYCOLAX) packet 17 g  17 g Oral Daily PRN Riley Hernandez MD  acetaminophen (TYLENOL) tablet 650 mg  650 mg Oral Q6H PRN Lonnie Garcia MD        Or    acetaminophen (TYLENOL) suppository 650 mg  650 mg Rectal Q6H PRN Lonnie Garcia MD        methIMAzole (TAPAZOLE) tablet 10 mg  10 mg Oral BID Wiley Gómez MD   10 mg at 22       No Known Allergies  Patient Active Problem List   Diagnosis    [facility]    Pancreatic mass    History of ERCP    Hyperthyroidism     Past Medical History:   Diagnosis Date    [facility]-fib ([facility]     Arthritis     Hypertension     Pancreatic mass     Thyroid disease      Past Surgical History:   Procedure Laterality Date    ERCP      HERNIA REPAIR       Social History     Tobacco Use    Smoking status: Former Smoker     Quit date: 2021     Years since quittin.1    Smokeless tobacco: Never Used   Vaping Use    Vaping Use: Never used   Substance Use Topics    Alcohol use: Not Currently    Drug use: Never         Vital Signs (Current)   Vitals:    22 0300   BP: 120/84   Pulse: 102   Resp: 17   Temp: 97.8 °F (36.6 °C)   SpO2:      Vital Signs Statistics (for past 48 hrs)     Temp  Av.4 °F (36.9 °C)  Min: 97.3 °F (36.3 °C)   Min taken time: 22 0715  Max: 100.8 °F (38.2 °C)   Max taken time: 22 1600  Pulse  Av.3  Min: 80   Min taken time: 22 1130  Max: 149   Max taken time: 22 0800  Resp  Av.7  Min: 10   Min taken time: 22 1708  Max: 28   Max taken time: 22 1630  BP  Min: 102/58   Min taken time: 22 0900  Max: 143/114   Max taken time: 22 1708  MAP (mmHg)  Av.4  Min: 68   Min taken time: 22 0900  Max: 125   Max taken time: 22 1708  SpO2  Av.6 %  Min: 89 %   Min taken time: 22 1708  Max: 98 %   Max taken time: 22 0000  BP Readings from Last 3 Encounters:   22 120/84   22 (!) 105/94       BMI  There is no height or weight on file to calculate BMI.     CBC   Lab Results   Component Value Date WBC 4.8 02/04/2022    RBC 3.86 02/04/2022    HGB 12.5 02/04/2022    HCT 37.4 02/04/2022    MCV 96.9 02/04/2022    RDW 14.7 02/04/2022     02/04/2022       CMP    Lab Results   Component Value Date     02/04/2022    K 3.4 02/04/2022     02/04/2022    CO2 21 02/04/2022    BUN 14 02/04/2022    CREATININE 0.74 02/04/2022    GFRAA >60 02/04/2022    LABGLOM >60 02/04/2022    GLUCOSE 102 02/04/2022    PROT 4.8 02/04/2022    CALCIUM 7.9 02/04/2022    BILITOT 1.76 02/04/2022    ALKPHOS 167 02/04/2022    AST 29 02/04/2022    ALT 22 02/04/2022       BMP    Lab Results   Component Value Date     02/04/2022    K 3.4 02/04/2022     02/04/2022    CO2 21 02/04/2022    BUN 14 02/04/2022    CREATININE 0.74 02/04/2022    CALCIUM 7.9 02/04/2022    GFRAA >60 02/04/2022    LABGLOM >60 02/04/2022    GLUCOSE 102 02/04/2022       POC Testing  No results for input(s): POCGLU, POCNA, POCK, POCCL, POCBUN, POCHEMO, POCHCT in the last 72 hours. Coags    Lab Results   Component Value Date    PROTIME 12.6 02/02/2022    INR 1.2 02/02/2022    APTT 57.6 02/03/2022       HCG (If Applicable) No results found for: PREGTESTUR, PREGSERUM, HCG, HCGQUANT     ABGs No results found for: PHART, PO2ART, JAK6VGA, JLP0VQS, BEART, U1PXNOLZ     Type & Screen (If Applicable)  No results found for: [facility]    Radiology (If Applicable)    Cardiac Testing (If Applicable) EF 48%,TXR MR    EKG (If Applicable) AF,RVR,IRBBB,? Ant ischemia          Medications prior to admission:   Prior to Admission medications    Medication Sig Start Date End Date Taking?  Authorizing Provider   apixaban (ELIQUIS) 5 MG TABS tablet Take 5 mg by mouth 2 times daily 11/23/21   Historical Provider, MD   magnesium oxide (MAG-OX) 400 MG tablet Take 400 mg by mouth daily 1/21/22   Historical Provider, MD   potassium chloride (KLOR-CON) 10 MEQ extended release tablet Take 10 mEq by mouth 2 times daily 1/21/22   Historical Provider, MD   ursodiol (ACTIGALL) 300 MG capsule Take 300 mg by mouth 2 times daily    Historical Provider, MD   metoprolol tartrate (LOPRESSOR) 50 MG tablet Take 50 mg by mouth 3 times daily    Historical Provider, MD       Current medications:    Current Facility-Administered Medications   Medication Dose Route Frequency Provider Last Rate Last Admin    amiodarone (CORDARONE) 450 mg in dextrose 5 % 250 mL infusion  0.5 mg/min IntraVENous Continuous Hammad Romero MD 16.7 mL/hr at 02/04/22 0400 0.5 mg/min at 02/04/22 0400    metoprolol tartrate (LOPRESSOR) tablet 100 mg  100 mg Oral BID Janet Naik MD   100 mg at 02/03/22 2118    heparin (porcine) injection 3,730 Units  60 Units/kg IntraVENous PRN Jarocho Damon MD        heparin (porcine) injection 1,860 Units  30 Units/kg IntraVENous PRN Jarocho Damon MD   1,860 Units at 02/03/22 0318    heparin 25,000 units in dextrose 5% 250 mL (premix) infusion  5-30 Units/kg/hr IntraVENous Continuous Jarocho Damon MD   Stopped at 02/04/22 0400    furosemide (LASIX) injection 20 mg  20 mg IntraVENous Daily Janet Naik MD   20 mg at 02/03/22 9780    sodium chloride flush 0.9 % injection 5-40 mL  5-40 mL IntraVENous 2 times per day Lonnie Garcia MD   10 mL at 02/03/22 2120    sodium chloride flush 0.9 % injection 5-40 mL  5-40 mL IntraVENous PRN Lonnie Garcia MD        0.9 % sodium chloride infusion  25 mL IntraVENous PRN Lonnie Garcia MD        ondansetron (ZOFRAN-ODT) disintegrating tablet 4 mg  4 mg Oral Q8H PRN Lonnie Garcia MD        Or    ondansetron [facility]) injection 4 mg  4 mg IntraVENous Q6H PRN Lonnie Garcia MD        polyethylene glycol [facility]) packet 17 g  17 g Oral Daily PRN Lonnie Garcia MD        acetaminophen (TYLENOL) tablet 650 mg  650 mg Oral Q6H PRN Lonnie Garcia MD        Or    acetaminophen (TYLENOL) suppository 650 mg  650 mg Rectal Q6H PRN Lonnie Garcia MD        methIMAzole (TAPAZOLE) tablet 10 mg  10 mg Oral BID Wiley Gómez MD   10 mg at 02/03/22 2113       Allergies: No Known Allergies    Problem List:    Patient Active Problem List   Diagnosis Code    Atrial fibrillation with RVR ([facility] I48.91    Pancreatic mass K86.89    History of ERCP Z98.890    Hyperthyroidism E05.90       Past Medical History:        Diagnosis Date    A-fib ([facility]     Arthritis     Hypertension     Pancreatic mass     Thyroid disease        Past Surgical History:        Procedure Laterality Date    ERCP      HERNIA REPAIR         Social History:    Social History     Tobacco Use    Smoking status: Former Smoker     Quit date: 2021     Years since quittin.1    Smokeless tobacco: Never Used   Substance Use Topics    Alcohol use: Not Currently                                Counseling given: Not Answered      Vital Signs (Current):   Vitals:    22 2115 22 2118 22 0000 22 0300   BP: (!) 130/95 (!) 130/95 121/84 120/84   Pulse: 125 133 92 102   Resp: 17   Temp: 97.9 °F (36.6 °C)  98 °F (36.7 °C) 97.8 °F (36.6 °C)   TempSrc: Axillary  Oral Oral   SpO2: 96%  98%                                               BP Readings from Last 3 Encounters:   22 120/84   22 (!) 105/94       NPO Status:  MN                                                                               BMI:   Wt Readings from Last 3 Encounters:   22 137 lb (62.1 kg)     There is no height or weight on file to calculate BMI.    CBC:   Lab Results   Component Value Date    WBC 4.8 2022    RBC 3.86 2022    HGB 12.5 2022    HCT 37.4 2022    MCV 96.9 2022    RDW 14.7 2022     2022       CMP:   Lab Results   Component Value Date     2022    K 3.4 2022     2022    CO2 21 2022    BUN 14 2022    CREATININE 0.74 2022    GFRAA >60 2022    LABGLOM >60 2022    GLUCOSE 102 2022    PROT 4.8 2022    CALCIUM 7.9 2022    BILITOT 1.76 2022    ALKPHOS 167 2022 AST 29 02/04/2022    ALT 22 02/04/2022       POC Tests: No results for input(s): POCGLU, POCNA, POCK, POCCL, POCBUN, POCHEMO, POCHCT in the last 72 hours. Coags:   Lab Results   Component Value Date    PROTIME 12.6 02/02/2022    INR 1.2 02/02/2022    APTT 57.6 02/03/2022       HCG (If Applicable): No results found for: PREGTESTUR, PREGSERUM, HCG, HCGQUANT     ABGs: No results found for: PHART, PO2ART, HYI0BKS, KNV0KMZ, BEART, Q0NWLPDY     Type & Screen (If Applicable):  No results found for: LABABO, LABRH    Drug/Infectious Status (If Applicable):  No results found for: HIV, HEPCAB    COVID-19 Screening (If Applicable):   Lab Results   Component Value Date    COVID19 Not Detected 02/01/2022           Anesthesia Evaluation   no history of anesthetic complications:   Airway: Mallampati: II        Dental:          Pulmonary:   (+) current smoker    (-) recent URI                           Cardiovascular:  Exercise tolerance: good (>4 METS),   (+) hypertension:, dysrhythmias: atrial fibrillation,     (-) past MI and  angina                Neuro/Psych:      (-) seizures and CVA           GI/Hepatic/Renal:        (-) GERD      ROS comment: pancreaticmass. Endo/Other:    (+) hyperthyroidism::., .    (-) diabetes mellitus               Abdominal:             Vascular: Other Findings: Only one tooth          Anesthesia Plan      MAC     ASA 4       Induction: intravenous.                           Francisco Priest MD   2/4/2022

## 2022-02-04 NOTE — OP NOTE
EGD/EUS      Patient:   Veda Zapata    :    [birth year]    Facility:   [address] [place] [address]   Referring/PCP: Ramón Parkinson    Procedure:   Esophagogastroduodenoscopy --diagnostic, with biopsy and Endoscopic ultrasound with FNB of left lobe of the liver and Pancreatic head mass  Date:     2022   Endoscopist:  Nohelia Rojas MD     Indication:Obstructive Jaundice with  Pancreatic head mass s/p Biliary metal stent placement    Anesthesia:  MAC    Complications: None    Estimated blood loss: Minimal    Specimen collected: Yes    Description of Procedure:  Informed consent was obtained from the patient after explanation of the procedure including indications, description of the procedure,  benefits and possible risks and complications of the procedure, and alternatives. Questions were answered. The patient's history was reviewed and a directed physical examination was performed prior to the procedure. Patient was monitored throughout the procedure with pulse oximetry and periodic assessment of vital signs. Patient was sedated as noted above. With the patient in the left lateral decubitus position, the Olympus videoendoscope followed by endoechoendoscope was placed in the patient's mouth and under direct visualization passed into the esophagus. The scope was passed to the 2nd portion of the duodenum. The patient tolerated the procedure well and was taken to the recovery area in good condition. EGD Findings:  Esophagus: normal.   Stomach: Moderate gastritis characterized by erythema and hematin material was seen in the body and antrum of the stomach. Biopsied                   Normal stomach seen during retroflexion view   Duodenum: Metal stent seen in placed in the second portion of the duodenum. EUS findings:  Pancreas: A round mass was identified in the pancreatic head. The mass was hypoechoic, heterogenous with solid and cystic components .  The mass measured 32 mm by 25 mm in maximal cross-sectional diameter. The endosonographic borders were not well-defined. There was sonographic evidence suggesting invasion into the portal vein and superior mesenteric artery . Fine needle biopsy was performed. Color Doppler imaging was utilized prior to needle puncture to confirm a lack of significant vascular structures within the needle path. Eight  passes were made with the 25 gauge ultrasound biopsy needle using a transduodenal approach. A stylet was used. A cytologist was present and performed a preliminary cytologic examination. Preliminary cytology is suspicious for malignancy (final results are pending). Estimated blood loss was minimal. Verification of patient identification for the specimen was done by the physician and nurse using the patient's name and medical record number. PD  was tortuous with hyperechoic ductal walls. PD in the head 8 mm, in the body 5.3 mm and in the tail 3 mm in maximum cross sectional diameter. Pancreatic parenchyma was atrophic in the body and tail of the pancreas. CBD: Metal biliary stent in place with shadowing. Left adrenal: Normal.   Left lobe of liver: A round subtle ,hyperechoic lesion measuring 15 x 12 mm was seen in the left lobe of the liver. .Fine needle biopsy was performed. Color Doppler imaging was utilized prior to needle puncture to confirm a lack of significant vascular structures within the needle path. Five passes were made with the 25 gauge ultrasound biopsy needle using a transgastric  approach. A stylet was used. A cytologist was present and performed a preliminary cytologic examination. Preliminary cytology is NOT suspicious for malignancy (final results are pending). Estimated blood loss was minimal. Verification of patient identification for the specimen was done by the physician and nurse using the patient's name and medical record number  Lymphadenopathy: No pathologic lymphadenopathy seen in upper abdomen.        Recommendations: Stage T4,N0, M1 applies if liver biopsies consistent with cancer. Stage T4,N0,MX applies if liver biopsies not consistent with cancer                                      Restart Heparin after 5 hours .                                       Follow up with Oncologist and surgery as an outpatient                                      Further recommendations as per inpatient GI team                                    Electronically signed by Genaro Goodell , MD  on 2/4/2022 at 12:06 PM

## 2022-02-04 NOTE — PROGRESS NOTES
[facility]  Internal Medicine Teaching Residency Program  Inpatient Daily Progress Note  ______________________________________________________________________________    Patient: Kelli Calvo  YOB: 1940   JDO:[id number]    Acct: [de-identified]     Room: [facility] OR POOL RM/NONE  Admit date: 2/1/2022  Today's date: 02/04/22  Number of days in the hospital: 3    SUBJECTIVE   Admitting Diagnosis: Atrial fibrillation with RVR ([address]  CC: afib with RVR seen on monitor  Pt examined at bedside. Chart & results reviewed. No new issues. On amio 0.5 mg/min. Rate better controlled. Afebrile. Cultures negative. NPO since midnight for EUS today. Heparin on hold for EUS. Labs reviewed; K 3.4      ROS:  Constitutional:  negative for chills, fevers, sweats  Respiratory:  negative for cough, dyspnea on exertion, hemoptysis, shortness of breath, wheezing  Cardiovascular:  negative for chest pain, chest pressure/discomfort, lower extremity edema, palpitations  Gastrointestinal:  negative for abdominal pain, constipation, diarrhea, nausea, vomiting  Neurological:  negative for dizziness, headache    BRIEF HISTORY     The patient is a pleasant 80 y.o. male presents with a chief complaint of a-fib with RVR seen on monitor. Patient was originally scheduled for EUS today with Dr. Ekaterina Madrigal. When he was brought down, monitor showed that he had Afib with hr of 150. Patient denies any palpitations or CP at that time. Patient reports that this has happened before in the week prior. Notably about a month ago, patient was taken for ERCP due to pancreatic mass and had a metal stent placed in CBD . Patient was given lopressor and then switched dot cardizem gtt in the ED. Patient is currently rate controlled. No palpitations or CP present. Patient denies any fevers, chills, SOB, CP. Smokes 2 cigarettes a day, no alcohol, no illegal drugs. Labs remarkable for low TSH with eleveated free thyroxine. Patient currently does not take any thyroid medications. Currently HDS. On CT imaging show air in portal venous system but this is most likely due to recent biliary stent placement since abdomen is benign. OBJECTIVE     Vital Signs:  /80   Pulse 97   Temp 96.4 °F (35.8 °C) (Temporal)   Resp 14   SpO2 96%     Temp (24hrs), Av.6 °F (36.4 °C), Min:96.4 °F (35.8 °C), Max:98 °F (36.7 °C)    In: 400   Out: 1650 [Urine:1650]    Physical Exam:  Physical Exam  Constitutional:       General: He is not in acute distress. Appearance: Normal appearance. He is normal weight. He is not ill-appearing, toxic-appearing or diaphoretic. HENT:      Mouth/Throat:      Mouth: Mucous membranes are moist.      Pharynx: Oropharynx is clear. Eyes:      General: No scleral icterus. Cardiovascular:      Rate and Rhythm: Normal rate and regular rhythm. Pulses: Normal pulses. Heart sounds: Normal heart sounds. No murmur heard. No friction rub. No gallop. Pulmonary:      Effort: Pulmonary effort is normal.      Breath sounds: Normal breath sounds. Abdominal:      General: Abdomen is flat. Bowel sounds are normal. There is no distension. Palpations: Abdomen is soft. There is no mass. Tenderness: There is no abdominal tenderness. There is no guarding or rebound. Hernia: No hernia is present. Musculoskeletal:         General: No swelling, tenderness, deformity or signs of injury. Right lower leg: No edema. Left lower leg: No edema. Skin:     General: Skin is warm and dry. Coloration: Skin is not jaundiced or pale. Findings: No bruising. Neurological:      General: No focal deficit present. Mental Status: He is alert and oriented to person, place, and time. Cranial Nerves: No cranial nerve deficit. Motor: No weakness. Psychiatric:         Mood and Affect: Mood normal.         Behavior: Behavior normal.         Thought Content:  Thought content normal. Judgment: Judgment normal.       Medications:  Scheduled Medications:    [MAR Hold] potassium chloride  10 mEq IntraVENous Q1H    [MAR Hold] metoprolol tartrate  100 mg Oral BID    [MAR Hold] sodium chloride flush  5-40 mL IntraVENous 2 times per day    [MAR Hold] methIMAzole  10 mg Oral BID     Continuous Infusions:    [MAR Hold] amiodarone 0.5 mg/min (02/04/22 0400)    [MAR Hold] heparin (PORCINE) Infusion Stopped (02/04/22 0400)    [MAR Hold] sodium chloride       PRN Medications[MAR Hold] heparin (porcine), 60 Units/kg, PRN  [MAR Hold] heparin (porcine), 30 Units/kg, PRN  [MAR Hold] sodium chloride flush, 5-40 mL, PRN  [MAR Hold] sodium chloride, 25 mL, PRN  [MAR Hold] ondansetron, 4 mg, Q8H PRN   Or  [MAR Hold] ondansetron, 4 mg, Q6H PRN  [MAR Hold] polyethylene glycol, 17 g, Daily PRN  [MAR Hold] acetaminophen, 650 mg, Q6H PRN   Or  [MAR Hold] acetaminophen, 650 mg, Q6H PRN        Diagnostic Labs:  CBC:   Recent Labs     02/02/22 0422 02/03/22  0202 02/04/22  0537   WBC 6.8 5.1 4.8   RBC 3.68* 3.74* 3.86*   HGB 12.0* 12.2* 12.5*   HCT 36.8* 37.0* 37.4*   .0 98.9 96.9   RDW 15.2* 14.8* 14.7*    165 173     BMP:   Recent Labs     02/02/22 0422 02/03/22  0202 02/04/22  0537    132* 135   K 4.0 3.0* 3.4*   * 100 104   CO2 18* 23 21   BUN 12 10 14   CREATININE 0.49* 0.59* 0.74     BNP: No results for input(s): BNP in the last 72 hours. PT/INR:   Recent Labs     02/02/22 0422   PROTIME 12.6*   INR 1.2     APTT:   Recent Labs     02/03/22  0901 02/03/22  1626 02/03/22 2236   APTT 82.8* 56.8* 57.6*     CARDIAC ENZYMES: No results for input(s): CKMB, CKMBINDEX, TROPONINI in the last 72 hours.     Invalid input(s): CKTOTAL;3  FASTING LIPID PANEL:No results found for: CHOL, HDL, TRIG  LIVER PROFILE:   Recent Labs     02/01/22  1012 02/01/22  1012 02/02/22  0422 02/03/22  0202 02/04/22  0537   AST 48*   < > 43* 29 29   ALT 30   < > 28 24 22   BILIDIR 1.43*  --   --   --   -- BILITOT 2.12*   < > 1.89* 1.83* 1.76*   ALKPHOS 206*   < > 193* 170* 167*    < > = values in this interval not displayed. MICROBIOLOGY:   Lab Results   Component Value Date/Time    CULTURE NO GROWTH 12 HOURS 02/03/2022 09:39 AM       Imaging:    CT ABDOMEN PELVIS W IV CONTRAST Additional Contrast? None    Result Date: 2/1/2022  Findings consistent with pancreatic carcinoma. There is a biliary ductal stent in place which accounts for pneumobilia. RECOMMENDATIONS: Unavailable     CT CHEST PULMONARY EMBOLISM W CONTRAST    Result Date: 2/1/2022  1. No evidence for acute pulmonary embolism. 2. Patchy bilateral irregular ground-glass and more dense airspace densities noted with underlying reticular septal thickening. Imaging features can be seen with COVID pneumonia, though are nonspecific and can occur with a variety of infectious and noninfectious processes. 3. Findings indicative of portal venous gas. There is probably superimposed pneumobilia. Air-fluid level is seen gallbladder presumed partially imaged CBD. Dedicated CT abdomen pelvis with IV contrast and oral contrast is recommended. RECOMMENDATIONS: Unavailable       ASSESSMENT & PLAN     Assessment and Plan:    Principal Problem:    Atrial fibrillation with RVR (HCC)  Active Problems:    Pancreatic mass    History of ERCP    Hyperthyroidism  Resolved Problems:    * No resolved hospital problems. *      1. Atrial fibrillation with RVR: Rate better controlled. Echo with good ejection fraction, moderate MR. Continue amiodarone infusion at 0.5 mg/min for better rate control at this point. Goal is short-term amiodarone as patient does not seem to be good candidate for long term amiodarone due to hyperthyroidism and liver dysfunction. Heparin infusion on hold for EUS today. Goal is to resume Eliquis on discharge. Continue metoprolol 100 mg twice daily. Lasix 20 mg IV x 3 days per cardiology. Replace electrolytes to keep K>4 and Mg>2.  Will consider adding Cardizem on discharge for better rate control. Patient to follow outpatient with his primary cardiologist, Dr. Anni Sanchez for consideration of ablation. 2. Newly diagnosed pancreatic mass: Recent ERCP with a biliary stent to relieve the obstructive jaundice from the pancreatic mass. Plan of US today for definitive diagnosis of pancreatic mass. GI following  3. Hyperthyroidism: Persistently low TSH. Continue methimazole 10 mg twice daily. GI prophylaxis: not indicated  DVT prophylaxis: Heparin infusion, on hold for EUS       Hammad Verma MD  Internal Medicine Resident, PGY-2  [address], [place]  2/4/2022,9:28 AM

## 2022-02-04 NOTE — PROGRESS NOTES
[address]    Admission Medication Reconciliation       The patient's list of current home medications has been reviewed. Source(s) of information: Patient, Dispense report    Based on information provided by the above source(s), I have updated the patient's home med list as described below. Please review the ACTION REQUESTED section of this note below for any discrepancies on current hospital orders. I changed or updated the following medications on the patient's home medication list:  Removed None     Added Methimazole 5 mg 1 tab PO daily (pt unsure of strength- previous note has strength listed as 5 mg, pt states he was increased to daily)     Adjusted   None   Other Notes None         PROVIDER ACTION REQUESTED  Medications that need to be addressed by a physician/nurse practitioner: None      Please feel free to call me with any questions about this encounter. Thank you.     Crystal Harkins  PharmD Candidate [id number]  2/4/2022 4:25 PM     Electronically signed by Crystal Harkins on 2/4/2022 at 4:23 PM       Medications Prior to Admission: methIMAzole (TAPAZOLE) 5 MG tablet, Take 5 mg by mouth daily  apixaban (ELIQUIS) 5 MG TABS tablet, Take 5 mg by mouth 2 times daily  magnesium oxide (MAG-OX) 400 MG tablet, Take 400 mg by mouth daily  potassium chloride (KLOR-CON) 10 MEQ extended release tablet, Take 10 mEq by mouth 2 times daily  ursodiol (ACTIGALL) 300 MG capsule, Take 300 mg by mouth 2 times daily  metoprolol tartrate (LOPRESSOR) 50 MG tablet, Take 50 mg by mouth 3 times daily

## 2022-02-05 LAB
ABSOLUTE EOS #: 0.07 K/UL (ref 0–0.4)
ABSOLUTE IMMATURE GRANULOCYTE: 0 K/UL (ref 0–0.3)
ABSOLUTE LYMPH #: 1.29 K/UL (ref 1–4.8)
ABSOLUTE MONO #: 0.34 K/UL (ref 0.1–0.8)
ALBUMIN SERPL-MCNC: 2.6 G/DL (ref 3.5–5.2)
ALBUMIN/GLOBULIN RATIO: 0.9 (ref 1–2.5)
ALP BLD-CCNC: 187 U/L (ref 40–129)
ALT SERPL-CCNC: 27 U/L (ref 5–41)
ANION GAP SERPL CALCULATED.3IONS-SCNC: 13 MMOL/L (ref 9–17)
AST SERPL-CCNC: 39 U/L
BASOPHILS # BLD: 0 % (ref 0–2)
BASOPHILS ABSOLUTE: 0 K/UL (ref 0–0.2)
BILIRUB SERPL-MCNC: 1.93 MG/DL (ref 0.3–1.2)
BUN BLDV-MCNC: 19 MG/DL (ref 8–23)
BUN/CREAT BLD: ABNORMAL (ref 9–20)
CALCIUM SERPL-MCNC: 8.5 MG/DL (ref 8.6–10.4)
CHLORIDE BLD-SCNC: 98 MMOL/L (ref 98–107)
CO2: 22 MMOL/L (ref 20–31)
CREAT SERPL-MCNC: 0.95 MG/DL (ref 0.7–1.2)
DIFFERENTIAL TYPE: ABNORMAL
EKG ATRIAL RATE: 129 BPM
EKG Q-T INTERVAL: 396 MS
EKG QRS DURATION: 126 MS
EKG QTC CALCULATION (BAZETT): 516 MS
EKG R AXIS: 65 DEGREES
EKG T AXIS: 63 DEGREES
EKG VENTRICULAR RATE: 102 BPM
EOSINOPHILS RELATIVE PERCENT: 1 % (ref 1–4)
GFR AFRICAN AMERICAN: >60 ML/MIN
GFR NON-AFRICAN AMERICAN: >60 ML/MIN
GFR SERPL CREATININE-BSD FRML MDRD: ABNORMAL ML/MIN/{1.73_M2}
GFR SERPL CREATININE-BSD FRML MDRD: ABNORMAL ML/MIN/{1.73_M2}
GLUCOSE BLD-MCNC: 98 MG/DL (ref 70–99)
HCT VFR BLD CALC: 45 % (ref 40.7–50.3)
HEMOGLOBIN: 14.5 G/DL (ref 13–17)
IMMATURE GRANULOCYTES: 0 %
LYMPHOCYTES # BLD: 19 % (ref 24–44)
MCH RBC QN AUTO: 31.9 PG (ref 25.2–33.5)
MCHC RBC AUTO-ENTMCNC: 32.2 G/DL (ref 28.4–34.8)
MCV RBC AUTO: 98.9 FL (ref 82.6–102.9)
MONOCYTES # BLD: 5 % (ref 1–7)
MORPHOLOGY: ABNORMAL
NRBC AUTOMATED: 0 PER 100 WBC
PARTIAL THROMBOPLASTIN TIME: 30.8 SEC (ref 20.5–30.5)
PDW BLD-RTO: 14.7 % (ref 11.8–14.4)
PLATELET # BLD: 204 K/UL (ref 138–453)
PLATELET ESTIMATE: ABNORMAL
PMV BLD AUTO: 10.6 FL (ref 8.1–13.5)
POTASSIUM SERPL-SCNC: 4.1 MMOL/L (ref 3.7–5.3)
RBC # BLD: 4.55 M/UL (ref 4.21–5.77)
RBC # BLD: ABNORMAL 10*6/UL
SEG NEUTROPHILS: 75 % (ref 36–66)
SEGMENTED NEUTROPHILS ABSOLUTE COUNT: 5.1 K/UL (ref 1.8–7.7)
SODIUM BLD-SCNC: 133 MMOL/L (ref 135–144)
THYROXINE, FREE: 1.78 NG/DL (ref 0.93–1.7)
TOTAL PROTEIN: 5.5 G/DL (ref 6.4–8.3)
TSH SERPL DL<=0.05 MIU/L-ACNC: <0.01 MIU/L (ref 0.3–5)
WBC # BLD: 6.8 K/UL (ref 3.5–11.3)
WBC # BLD: ABNORMAL 10*3/UL

## 2022-02-05 PROCEDURE — 6370000000 HC RX 637 (ALT 250 FOR IP): Performed by: NURSE PRACTITIONER

## 2022-02-05 PROCEDURE — 93010 ELECTROCARDIOGRAM REPORT: CPT | Performed by: INTERNAL MEDICINE

## 2022-02-05 PROCEDURE — 2580000003 HC RX 258: Performed by: NURSE PRACTITIONER

## 2022-02-05 PROCEDURE — 6370000000 HC RX 637 (ALT 250 FOR IP): Performed by: STUDENT IN AN ORGANIZED HEALTH CARE EDUCATION/TRAINING PROGRAM

## 2022-02-05 PROCEDURE — 6360000002 HC RX W HCPCS: Performed by: NURSE PRACTITIONER

## 2022-02-05 PROCEDURE — 84439 ASSAY OF FREE THYROXINE: CPT

## 2022-02-05 PROCEDURE — 84443 ASSAY THYROID STIM HORMONE: CPT

## 2022-02-05 PROCEDURE — 2060000000 HC ICU INTERMEDIATE R&B

## 2022-02-05 PROCEDURE — 36415 COLL VENOUS BLD VENIPUNCTURE: CPT

## 2022-02-05 PROCEDURE — 99232 SBSQ HOSP IP/OBS MODERATE 35: CPT | Performed by: INTERNAL MEDICINE

## 2022-02-05 PROCEDURE — 80053 COMPREHEN METABOLIC PANEL: CPT

## 2022-02-05 PROCEDURE — 6360000002 HC RX W HCPCS: Performed by: STUDENT IN AN ORGANIZED HEALTH CARE EDUCATION/TRAINING PROGRAM

## 2022-02-05 PROCEDURE — 85025 COMPLETE CBC W/AUTO DIFF WBC: CPT

## 2022-02-05 PROCEDURE — 85730 THROMBOPLASTIN TIME PARTIAL: CPT

## 2022-02-05 PROCEDURE — 94761 N-INVAS EAR/PLS OXIMETRY MLT: CPT

## 2022-02-05 RX ORDER — DIGOXIN 0.25 MG/ML
125 INJECTION INTRAMUSCULAR; INTRAVENOUS DAILY
Status: DISCONTINUED | OUTPATIENT
Start: 2022-02-05 | End: 2022-02-06

## 2022-02-05 RX ADMIN — SODIUM CHLORIDE, PRESERVATIVE FREE 10 ML: 5 INJECTION INTRAVENOUS at 08:45

## 2022-02-05 RX ADMIN — METHIMAZOLE 10 MG: 5 TABLET ORAL at 08:57

## 2022-02-05 RX ADMIN — METOPROLOL TARTRATE 100 MG: 50 TABLET, FILM COATED ORAL at 08:57

## 2022-02-05 RX ADMIN — APIXABAN 5 MG: 5 TABLET, FILM COATED ORAL at 11:28

## 2022-02-05 RX ADMIN — APIXABAN 5 MG: 5 TABLET, FILM COATED ORAL at 21:04

## 2022-02-05 RX ADMIN — METOPROLOL TARTRATE 100 MG: 50 TABLET, FILM COATED ORAL at 21:04

## 2022-02-05 RX ADMIN — DIGOXIN 125 MCG: 0.25 INJECTION INTRAMUSCULAR; INTRAVENOUS at 12:50

## 2022-02-05 RX ADMIN — HEPARIN SODIUM 1860 UNITS: 1000 INJECTION INTRAVENOUS; SUBCUTANEOUS at 06:09

## 2022-02-05 RX ADMIN — HYDROCORTISONE SODIUM SUCCINATE 50 MG: 100 INJECTION, POWDER, FOR SOLUTION INTRAMUSCULAR; INTRAVENOUS at 12:39

## 2022-02-05 RX ADMIN — SODIUM CHLORIDE, PRESERVATIVE FREE 10 ML: 5 INJECTION INTRAVENOUS at 21:05

## 2022-02-05 RX ADMIN — METHIMAZOLE 10 MG: 5 TABLET ORAL at 21:04

## 2022-02-05 ASSESSMENT — PAIN SCALES - GENERAL
PAINLEVEL_OUTOF10: 0

## 2022-02-05 NOTE — CARE COORDINATION
Was called to patient's bedside as they are no w requesting placement. List provided. Choices in order  1. [facility]  2. [facility]  4. [facility]  5.   [facility]  Referrals sent

## 2022-02-05 NOTE — PLAN OF CARE
Problem: Skin Integrity:  Goal: Will show no infection signs and symptoms  Description: Will show no infection signs and symptoms  Outcome: Ongoing  Goal: Absence of new skin breakdown  Description: Absence of new skin breakdown  Outcome: Ongoing     Problem: Falls - Risk of:  Goal: Will remain free from falls  Description: Will remain free from falls  Outcome: Ongoing  Goal: Absence of physical injury  Description: Absence of physical injury  Outcome: Ongoing     Problem:  Activity:  Goal: Ability to tolerate increased activity will improve  Description: Ability to tolerate increased activity will improve  Outcome: Ongoing  Goal: Expression of feelings of increased energy will increase  Description: Expression of feelings of increased energy will increase  Outcome: Ongoing     Problem: Cardiac:  Goal: Ability to maintain an adequate cardiac output will improve  Description: Ability to maintain an adequate cardiac output will improve  Outcome: Ongoing  Goal: Complications related to the disease process, condition or treatment will be avoided or minimized  Description: Complications related to the disease process, condition or treatment will be avoided or minimized  Outcome: Ongoing     Problem: Cardiac:  Goal: Ability to maintain an adequate cardiac output will improve  Description: Ability to maintain an adequate cardiac output will improve  Outcome: Ongoing  Goal: Complications related to the disease process, condition or treatment will be avoided or minimized  Description: Complications related to the disease process, condition or treatment will be avoided or minimized  Outcome: Ongoing     Problem: Coping:  Goal: Level of anxiety will decrease  Description: Level of anxiety will decrease  Outcome: Ongoing  Goal: General experience of comfort will improve  Description: General experience of comfort will improve  Outcome: Ongoing     Problem: Health Behavior:  Goal: Ability to manage health-related needs will improve  Description: Ability to manage health-related needs will improve  Outcome: Ongoing     Problem: Safety:  Goal: Ability to remain free from injury will improve  Description: Ability to remain free from injury will improve  Outcome: Ongoing  Goal: Will show no signs and symptoms of excessive bleeding  Description: Will show no signs and symptoms of excessive bleeding  Outcome: Ongoing

## 2022-02-05 NOTE — PROGRESS NOTES
[facility]  Internal Medicine Teaching Residency Program  Inpatient Daily Progress Note  ______________________________________________________________________________    Patient: Angelita Dominguez  YOB: 1940   YES:[id number]    Acct: [de-identified]     Room: [address] [place] [address]  Admit date: 2/1/2022  Today's date: 02/05/22  Number of days in the hospital: 4    SUBJECTIVE   Admitting Diagnosis: Atrial fibrillation with RVR ([address]  CC: afib with RVR seen on monitor  Pt examined at bedside. Chart & results reviewed. S/p EUS on 2/4. Found liver and pancreatic mass. HR today is low 100s. Doing well. Possible DC today. ROS:  Constitutional:  negative for chills, fevers, sweats  Respiratory:  negative for cough, dyspnea on exertion, hemoptysis, shortness of breath, wheezing  Cardiovascular:  negative for chest pain, chest pressure/discomfort, lower extremity edema, palpitations  Gastrointestinal:  negative for abdominal pain, constipation, diarrhea, nausea, vomiting  Neurological:  negative for dizziness, headache    BRIEF HISTORY     The patient is a pleasant 80 y.o. male presents with a chief complaint of a-fib with RVR seen on monitor. Patient was originally scheduled for EUS today with Dr. Kemi Schmitt. When he was brought down, monitor showed that he had Afib with hr of 150. Patient denies any palpitations or CP at that time. Patient reports that this has happened before in the week prior. Notably about a month ago, patient was taken for ERCP due to pancreatic mass and had a metal stent placed in CBD . Patient was given lopressor and then switched dot cardizem gtt in the ED. Patient is currently rate controlled. No palpitations or CP present. Patient denies any fevers, chills, SOB, CP. Smokes 2 cigarettes a day, no alcohol, no illegal drugs. Labs remarkable for low TSH with eleveated free thyroxine. Patient currently does not take any thyroid medications. Currently HDS.  On CT imaging show air in portal venous system but this is most likely due to recent biliary stent placement since abdomen is benign. OBJECTIVE     Vital Signs:  BP (!) 133/96   Pulse 109   Temp 97.5 °F (36.4 °C) (Axillary)   Resp 14   SpO2 98%     Temp (24hrs), Av.4 °F (36.3 °C), Min:96.4 °F (35.8 °C), Max:98 °F (36.7 °C)    In: 890   Out: 1400 [Urine:1400]    Physical Exam:  Physical Exam  Constitutional:       General: He is not in acute distress. Appearance: Normal appearance. He is normal weight. He is not ill-appearing, toxic-appearing or diaphoretic. HENT:      Mouth/Throat:      Mouth: Mucous membranes are moist.      Pharynx: Oropharynx is clear. Eyes:      General: No scleral icterus. Cardiovascular:      Rate and Rhythm: Normal rate and regular rhythm. Pulses: Normal pulses. Heart sounds: Normal heart sounds. No murmur heard. No friction rub. No gallop. Pulmonary:      Effort: Pulmonary effort is normal.      Breath sounds: Normal breath sounds. Abdominal:      General: Abdomen is flat. Bowel sounds are normal. There is no distension. Palpations: Abdomen is soft. There is no mass. Tenderness: There is no abdominal tenderness. There is no guarding or rebound. Hernia: No hernia is present. Musculoskeletal:         General: No swelling, tenderness, deformity or signs of injury. Right lower leg: No edema. Left lower leg: No edema. Skin:     General: Skin is warm and dry. Coloration: Skin is not jaundiced or pale. Findings: No bruising. Neurological:      General: No focal deficit present. Mental Status: He is alert and oriented to person, place, and time. Cranial Nerves: No cranial nerve deficit. Motor: No weakness. Psychiatric:         Mood and Affect: Mood normal.         Behavior: Behavior normal.         Thought Content:  Thought content normal.         Judgment: Judgment normal.           Medications:  Scheduled Medications:    metoprolol tartrate  100 mg Oral BID    sodium chloride flush  5-40 mL IntraVENous 2 times per day    methIMAzole  10 mg Oral BID     Continuous Infusions:    amiodarone 0.5 mg/min (02/05/22 0300)    heparin (PORCINE) Infusion 14 Units/kg/hr (02/05/22 0607)    sodium chloride       PRN Medicationsheparin (porcine), 60 Units/kg, PRN  heparin (porcine), 30 Units/kg, PRN  sodium chloride flush, 5-40 mL, PRN  sodium chloride, 25 mL, PRN  ondansetron, 4 mg, Q8H PRN   Or  ondansetron, 4 mg, Q6H PRN  polyethylene glycol, 17 g, Daily PRN  acetaminophen, 650 mg, Q6H PRN   Or  acetaminophen, 650 mg, Q6H PRN        Diagnostic Labs:  CBC:   Recent Labs     02/03/22 0202 02/04/22 0537 02/05/22 0343   WBC 5.1 4.8 6.8   RBC 3.74* 3.86* 4.55   HGB 12.2* 12.5* 14.5   HCT 37.0* 37.4* 45.0   MCV 98.9 96.9 98.9   RDW 14.8* 14.7* 14.7*    173 204     BMP:   Recent Labs     02/03/22 0202 02/04/22 0537 02/05/22 0343   * 135 133*   K 3.0* 3.4* 4.1    104 98   CO2 23 21 22   BUN 10 14 19   CREATININE 0.59* 0.74 0.95     BNP: No results for input(s): BNP in the last 72 hours. PT/INR:   No results for input(s): PROTIME, INR in the last 72 hours. APTT:   Recent Labs     02/03/22 2236 02/04/22 2110 02/05/22 0343   APTT 57.6* 25.8 30.8*     CARDIAC ENZYMES: No results for input(s): CKMB, CKMBINDEX, TROPONINI in the last 72 hours. Invalid input(s): CKTOTAL;3  FASTING LIPID PANEL:No results found for: CHOL, HDL, TRIG  LIVER PROFILE:   Recent Labs     02/03/22  0202 02/04/22  0537 02/05/22  0343   AST 29 29 39   ALT 24 22 27   BILITOT 1.83* 1.76* 1.93*   ALKPHOS 170* 167* 187*      MICROBIOLOGY:   Lab Results   Component Value Date/Time    CULTURE NO GROWTH 1 DAY 02/03/2022 09:39 AM       Imaging:    CT ABDOMEN PELVIS W IV CONTRAST Additional Contrast? None    Result Date: 2/1/2022  Findings consistent with pancreatic carcinoma.   There is a biliary ductal stent in place which accounts for pneumobilia. RECOMMENDATIONS: Unavailable     CT CHEST PULMONARY EMBOLISM W CONTRAST    Result Date: 2/1/2022  1. No evidence for acute pulmonary embolism. 2. Patchy bilateral irregular ground-glass and more dense airspace densities noted with underlying reticular septal thickening. Imaging features can be seen with COVID pneumonia, though are nonspecific and can occur with a variety of infectious and noninfectious processes. 3. Findings indicative of portal venous gas. There is probably superimposed pneumobilia. Air-fluid level is seen gallbladder presumed partially imaged CBD. Dedicated CT abdomen pelvis with IV contrast and oral contrast is recommended. RECOMMENDATIONS: Unavailable       ASSESSMENT & PLAN     Assessment and Plan:    Principal Problem:    Atrial fibrillation with RVR (HCC)  Active Problems:    Pancreatic mass    History of ERCP    Hyperthyroidism  Resolved Problems:    * No resolved hospital problems.  *    Afib with RVR  - continuous tele  - on amio gtt and had 250 digoxin   - f/u cardiac consult for clearance      Pancreatic mass  - stent placed 1 month ago  - EUS performed 2/4 showing liver and pancreatic mass   - will need f/u OP      Hyperthyroidism  - found to have low TSH of <0.01  - free thyroxine 3.77  - continue methimazole.      Hx of ERCP  - stent placed 1 month ago  - CT showed pneumobilia    Hypokalemia  -resolved            Lonnie Garcia MD  Internal Medicine Resident  Artie Sosa  2/5/2022 8:35 AM

## 2022-02-05 NOTE — DISCHARGE INSTR - COC
Continuity of Care Form    Patient Name: Billy Hill   :    MRN:  [id number]    Admit date:  2022  Discharge date:  ***    Code Status Order: Full Code   Advance Directives:   Advance Care Flowsheet Documentation       Date/Time Healthcare Directive Type of Healthcare Directive Copy in [address] Agent's Name Healthcare Agent's Phone Number    22 1748 No, patient does not have an advance directive for healthcare treatment -- -- -- -- --            Admitting Physician:  Srikanth Quiroz MD  PCP: Quentin Sandoval    Discharging Nurse: [facility] Unit/Room#: 9833/4177-26  Discharging Unit Phone Number: ***    Emergency Contact:   Extended Emergency Contact Information  Primary Emergency Contact: Jm Alvarez  Mobile Phone: [phone]  Relation: Brother/Sister  Preferred language: [place]   needed? No  Secondary Emergency Contact: Mindy Pitt  Mobile Phone: [phone]  Relation: Brother/Sister  Preferred language: English   needed? No    Past Surgical History:  Past Surgical History:   Procedure Laterality Date    ERCP      HERNIA REPAIR      UPPER GASTROINTESTINAL ENDOSCOPY  2022    biopsies and EUS with biopsies       Immunization History: There is no immunization history on file for this patient.     Active Problems:  Patient Active Problem List   Diagnosis Code    Atrial fibrillation with RVR ([facility] [address] I48.91    Pancreatic mass K86.89    History of ERCP Z98.890    Hyperthyroidism E05.90       Isolation/Infection:   Isolation            No Isolation          Patient Infection Status       Infection Onset Added Last Indicated Last Indicated By Review Planned Expiration Resolved Resolved By    None active    Resolved    COVID-19 (Rule Out) 22 Respiratory Panel, Molecular, with COVID-19 (Restricted: peds pts or suitable admitted adults) (Ordered)   22 Nino Spatz, RN            Nurse Assessment:  Last Vital Signs: BP (!) 133/96   Pulse 109   Temp 97.5 °F (36.4 °C) (Axillary)   Resp 14   SpO2 98%     Last documented pain score (0-10 scale): Pain Level: 0  Last Weight:   Wt Readings from Last 1 Encounters:   02/01/22 137 lb (62.1 kg)     Mental Status:  oriented, alert, and coherent    IV Access:  - None    Nursing Mobility/ADLs:  Walking   Assisted  Transfer  Assisted  Bathing  Assisted  Dressing  Assisted  Toileting  Assisted  Feeding  Independent  Med Admin  Dependent  Med Delivery   whole    Wound Care Documentation and Therapy:        Elimination:  Continence: Bowel: Yes  Bladder: Yes  Urinary Catheter: None   Colostomy/Ileostomy/Ileal Conduit: No       Date of Last BM: 2/6/2022    Intake/Output Summary (Last 24 hours) at 2/5/2022 1010  Last data filed at 2/5/2022 0300  Gross per 24 hour   Intake 890 ml   Output 1400 ml   Net -510 ml     I/O last 3 completed shifts: In: 0918 [P.O.:870; I.V.:420]  Out: 2150 [Urine:2150]    Safety Concerns: At Risk for Falls    Impairments/Disabilities:      Vision and Hearing    Nutrition Therapy:  Current Nutrition Therapy:   - Oral Diet:  General  - Oral Nutrition Supplement:  Standard  three times a day    Routes of Feeding: Oral  Liquids: Thin Liquids  Daily Fluid Restriction: no  Last Modified Barium Swallow with Video (Video Swallowing Test): not done    Treatments at the Time of Hospital Discharge:   Respiratory Treatments: ***  Oxygen Therapy:  is not on home oxygen therapy.   Ventilator:    - No ventilator support    Rehab Therapies: Physical Therapy and Occupational Therapy  Weight Bearing Status/Restrictions: No weight bearing restirctions  Other Medical Equipment (for information only, NOT a DME order):  walker  Other Treatments: *none*    Patient's personal belongings (please select all that are sent with patient):  None    RN SIGNATURE:  Electronically signed by Amna Martinez RN on 2/7/22 at 3:53 PM EST    CASE MANAGEMENT/SOCIAL WORK SECTION    Inpatient Status Date: ***    Readmission Risk Assessment Score:  Readmission Risk              Risk of Unplanned Readmission:  10           Discharg[facility] Facility/ Agency   [facility]   [facility] Details  FAX [address] [address]       Phone: [phone]       Fax: [phone]          Dialysis Facility (if applicable)   Name:  Address:  Dialysis Schedule:  Phone:  Fax:    / signature: Electronically signed by Rj Ortiz RN on 2/7/22 at 3:07 PM EST    PHYSICIAN SECTION    Prognosis: Good    Condition at Discharge: Stable    Rehab Potential (if transferring to Rehab): Fair    Recommended Labs or Other Treatments After Discharge:   -Digoxin 125 mcg daily  -Lopressor 100 mg twice daily  -Methimazole 10 mg  daily after a week  -Patient to follow with PCP, GI and cardiology. Follow-up with pancreatic biopsy pathology    Physician Certification: I certify the above information and transfer of Inder Ndiaye  is necessary for the continuing treatment of the diagnosis listed and that he requires [facility] for greater 30 days.      Update Admission H&P: No change in H&P    PHYSICIAN SIGNATURE:  Electronically signed by Anu Bateman MD on 2/6/22 at 12:25 PM EST

## 2022-02-06 LAB
ABSOLUTE EOS #: <0.03 K/UL (ref 0–0.44)
ABSOLUTE IMMATURE GRANULOCYTE: 0.03 K/UL (ref 0–0.3)
ABSOLUTE LYMPH #: 0.91 K/UL (ref 1.1–3.7)
ABSOLUTE MONO #: 0.25 K/UL (ref 0.1–1.2)
ALBUMIN SERPL-MCNC: 2.2 G/DL (ref 3.5–5.2)
ALBUMIN/GLOBULIN RATIO: 0.8 (ref 1–2.5)
ALP BLD-CCNC: 138 U/L (ref 40–129)
ALT SERPL-CCNC: 19 U/L (ref 5–41)
ANION GAP SERPL CALCULATED.3IONS-SCNC: 10 MMOL/L (ref 9–17)
AST SERPL-CCNC: 21 U/L
BASOPHILS # BLD: 0 % (ref 0–2)
BASOPHILS ABSOLUTE: <0.03 K/UL (ref 0–0.2)
BILIRUB SERPL-MCNC: 1.5 MG/DL (ref 0.3–1.2)
BUN BLDV-MCNC: 21 MG/DL (ref 8–23)
BUN/CREAT BLD: ABNORMAL (ref 9–20)
CALCIUM SERPL-MCNC: 8.2 MG/DL (ref 8.6–10.4)
CHLORIDE BLD-SCNC: 97 MMOL/L (ref 98–107)
CO2: 24 MMOL/L (ref 20–31)
CREAT SERPL-MCNC: 0.65 MG/DL (ref 0.7–1.2)
DIFFERENTIAL TYPE: ABNORMAL
EOSINOPHILS RELATIVE PERCENT: 0 % (ref 1–4)
GFR AFRICAN AMERICAN: >60 ML/MIN
GFR NON-AFRICAN AMERICAN: >60 ML/MIN
GFR SERPL CREATININE-BSD FRML MDRD: ABNORMAL ML/MIN/{1.73_M2}
GFR SERPL CREATININE-BSD FRML MDRD: ABNORMAL ML/MIN/{1.73_M2}
GLUCOSE BLD-MCNC: 147 MG/DL (ref 70–99)
HCT VFR BLD CALC: 38.1 % (ref 40.7–50.3)
HEMOGLOBIN: 12.4 G/DL (ref 13–17)
IMMATURE GRANULOCYTES: 0 %
LYMPHOCYTES # BLD: 12 % (ref 24–43)
MCH RBC QN AUTO: 31.9 PG (ref 25.2–33.5)
MCHC RBC AUTO-ENTMCNC: 32.5 G/DL (ref 28.4–34.8)
MCV RBC AUTO: 97.9 FL (ref 82.6–102.9)
MONOCYTES # BLD: 3 % (ref 3–12)
NRBC AUTOMATED: 0 PER 100 WBC
PDW BLD-RTO: 14.3 % (ref 11.8–14.4)
PLATELET # BLD: 180 K/UL (ref 138–453)
PLATELET ESTIMATE: ABNORMAL
PMV BLD AUTO: 10.5 FL (ref 8.1–13.5)
POTASSIUM SERPL-SCNC: 3.8 MMOL/L (ref 3.7–5.3)
RBC # BLD: 3.89 M/UL (ref 4.21–5.77)
RBC # BLD: ABNORMAL 10*6/UL
SEG NEUTROPHILS: 85 % (ref 36–65)
SEGMENTED NEUTROPHILS ABSOLUTE COUNT: 6.47 K/UL (ref 1.5–8.1)
SODIUM BLD-SCNC: 131 MMOL/L (ref 135–144)
TOTAL PROTEIN: 4.8 G/DL (ref 6.4–8.3)
WBC # BLD: 7.7 K/UL (ref 3.5–11.3)
WBC # BLD: ABNORMAL 10*3/UL

## 2022-02-06 PROCEDURE — 97162 PT EVAL MOD COMPLEX 30 MIN: CPT

## 2022-02-06 PROCEDURE — 6370000000 HC RX 637 (ALT 250 FOR IP): Performed by: NURSE PRACTITIONER

## 2022-02-06 PROCEDURE — 2580000003 HC RX 258: Performed by: NURSE PRACTITIONER

## 2022-02-06 PROCEDURE — 6370000000 HC RX 637 (ALT 250 FOR IP): Performed by: STUDENT IN AN ORGANIZED HEALTH CARE EDUCATION/TRAINING PROGRAM

## 2022-02-06 PROCEDURE — 80053 COMPREHEN METABOLIC PANEL: CPT

## 2022-02-06 PROCEDURE — 99232 SBSQ HOSP IP/OBS MODERATE 35: CPT | Performed by: INTERNAL MEDICINE

## 2022-02-06 PROCEDURE — 97530 THERAPEUTIC ACTIVITIES: CPT

## 2022-02-06 PROCEDURE — 36415 COLL VENOUS BLD VENIPUNCTURE: CPT

## 2022-02-06 PROCEDURE — 2060000000 HC ICU INTERMEDIATE R&B

## 2022-02-06 PROCEDURE — 6360000002 HC RX W HCPCS: Performed by: STUDENT IN AN ORGANIZED HEALTH CARE EDUCATION/TRAINING PROGRAM

## 2022-02-06 PROCEDURE — 85025 COMPLETE CBC W/AUTO DIFF WBC: CPT

## 2022-02-06 RX ORDER — DIGOXIN 125 MCG
125 TABLET ORAL DAILY
Status: DISCONTINUED | OUTPATIENT
Start: 2022-02-06 | End: 2022-02-07 | Stop reason: HOSPADM

## 2022-02-06 RX ORDER — METOPROLOL TARTRATE 100 MG/1
100 TABLET ORAL 2 TIMES DAILY
Qty: 60 TABLET | Refills: 3 | Status: SHIPPED | OUTPATIENT
Start: 2022-02-06

## 2022-02-06 RX ORDER — DIGOXIN 125 MCG
125 TABLET ORAL DAILY
Qty: 30 TABLET | Refills: 3 | Status: SHIPPED | OUTPATIENT
Start: 2022-02-07

## 2022-02-06 RX ORDER — METHIMAZOLE 10 MG/1
10 TABLET ORAL 2 TIMES DAILY
Qty: 60 TABLET | Refills: 2 | Status: SHIPPED | OUTPATIENT
Start: 2022-02-06 | End: 2022-03-08

## 2022-02-06 RX ADMIN — SODIUM CHLORIDE, PRESERVATIVE FREE 10 ML: 5 INJECTION INTRAVENOUS at 08:26

## 2022-02-06 RX ADMIN — HYDROCORTISONE SODIUM SUCCINATE 50 MG: 100 INJECTION, POWDER, FOR SOLUTION INTRAMUSCULAR; INTRAVENOUS at 17:44

## 2022-02-06 RX ADMIN — SODIUM CHLORIDE, PRESERVATIVE FREE 10 ML: 5 INJECTION INTRAVENOUS at 20:05

## 2022-02-06 RX ADMIN — METHIMAZOLE 10 MG: 5 TABLET ORAL at 20:05

## 2022-02-06 RX ADMIN — DIGOXIN 125 MCG: 125 TABLET ORAL at 08:24

## 2022-02-06 RX ADMIN — APIXABAN 5 MG: 5 TABLET, FILM COATED ORAL at 08:24

## 2022-02-06 RX ADMIN — APIXABAN 5 MG: 5 TABLET, FILM COATED ORAL at 20:05

## 2022-02-06 RX ADMIN — METHIMAZOLE 10 MG: 5 TABLET ORAL at 08:24

## 2022-02-06 RX ADMIN — METOPROLOL TARTRATE 100 MG: 50 TABLET, FILM COATED ORAL at 08:24

## 2022-02-06 RX ADMIN — HYDROCORTISONE SODIUM SUCCINATE 50 MG: 100 INJECTION, POWDER, FOR SOLUTION INTRAMUSCULAR; INTRAVENOUS at 01:34

## 2022-02-06 RX ADMIN — METOPROLOL TARTRATE 100 MG: 50 TABLET, FILM COATED ORAL at 20:05

## 2022-02-06 ASSESSMENT — PAIN SCALES - GENERAL
PAINLEVEL_OUTOF10: 0

## 2022-02-06 NOTE — PROGRESS NOTES
Physical Therapy    Facility/Department: [facility] 4A STEPDOWN  Initial Assessment    NAME: Priya Jurado  : [birth year]  MRN: [id number]  Chief Complaint   Patient presents with    Atrial Fibrillation       Date of Service: 2022    Discharge Recommendations: Further therapy recommended at discharge. PT Equipment Recommendations  Equipment Needed: Yes  Mobility Devices: [place] Perches: Rolling    Assessment   Body structures, Functions, Activity limitations: Decreased functional mobility ; Decreased safe awareness;Decreased balance;Decreased ADL status; Decreased vision/visual deficit; Decreased ROM; Decreased endurance;Decreased high-level IADLs;Decreased strength;Decreased posture  Assessment: Pt. ambulated x50' with RW and CGA, and x10' with straight cane and CGA on this date. Following ambulation with RW, pt. reported feelings of dizziness and required a seated rest break. He was able to completed transfers with CGA and bed mobility with SBA. This pt. is currently demonstrated deconditioning and significantly reduced walking endurance and dynamic balance placing him at fall risk. Due to his reduced endurance, he is unsafe to return home without 24 hour assistance/care. Pt. will benefit from skilled PT to improve functional mobility needed for a safe D/C. Prognosis: Good  Decision Making: Medium Complexity  PT Education: Goals;Transfer Training;Equipment;PT Role;Energy Conservation; Functional Mobility Training;General Safety; Adaptive Device Training;Precautions  Patient Education: Educated pt. and family member extensively on rehab provision within SNF vs. IPR. Additionally provided education on proper gait mechanics with a RW, belly breathing for reduction in HR, and on significance of poor endurance. REQUIRES PT FOLLOW UP: Yes  Activity Tolerance  Activity Tolerance: Patient limited by endurance  Activity Tolerance: Pt maintained good O2 saturation throughout session, with lowest observed value of 98% SPO2. At rest, HR was 92bpm when entering room, but elevated to a highest observed value of 118bpm following supine->sit at EOB transition. Educated pt. on belly breathing techniques, and HR reduced below 110bpm. With MMT, HR again elevated to a max observed value of 123bpm, but once again reduced to below 100bpm with seated rest. Following x50' of amulation with RW and CGA, pt. reported feeling dizzy and tired. Patient Diagnosis(es): The encounter diagnosis was Atrial fibrillation with RVR ([facility] [address] has a past medical history of A-fib ([facility] [address] Arthritis, Hypertension, Pancreatic mass, and Thyroid disease. has a past surgical history that includes ERCP; hernia repair; and Upper gastrointestinal endoscopy (02/04/2022). Restrictions  Restrictions/Precautions  Restrictions/Precautions: Up as Tolerated,General Precautions,Fall Risk  Required Braces or Orthoses?: No  Position Activity Restriction  Other position/activity restrictions: Pancreatic head mass s/p Biliary metal stent placement  Vision/Hearing  Vision: Impaired  Vision Exceptions: Wears glasses for reading  Hearing: Within functional limits     Subjective  General  Patient assessed for rehabilitation services?: Yes  Additional Pertinent Hx: Post-operative report indicative of liver ca. Response To Previous Treatment: Not applicable  Family / Caregiver Present: Yes (sister)  Follows Commands: Within Functional Limits  Subjective  Subjective: Pt. found supine in bed with HOB elevated ~30 degrees. RN and pt. agreeable to PT evaluation at this time. Pt. remained cooperative and pleasant throughout the session.   Pain Screening  Patient Currently in Pain: Denies  Pain Assessment  Pain Level: 0       Orientation  Orientation  Overall Orientation Status: Within Functional Limits (oriented x4)  Social/Functional History  Social/Functional History  Lives With: Alone  Type of Home: House  Home Layout: Two level (7 steps to reach 2nd floor)  Home Access: Stairs to bed mobility. Does not report feeling SOB, lightheaded or dizzy. Transfers  Sit to Stand: Contact guard assistance  Stand to sit: Contact guard assistance  Comment: Sit->stand transfer completed with  for stability, stand->sit transfer completed with [facility] for stability. Ambulation  Ambulation?: Yes  More Ambulation?: Yes  Ambulation 1  Surface: level tile  Device: Rolling Walker (Pt. demonstrates increased stability with RW compared to ambulation with cane.)  Assistance: Contact guard assistance  Gait Deviations: Slow Shannon;Decreased head and trunk rotation;Decreased arm swing; Increased [place];Shuffles;Decreased step height;Decreased step length  Distance: x50'  Comments: Pt. requierd intermittent cues to maintain MONROE inside of RW base. During ambulation, pt. demonstrates excessive L toe-out throughout gait cycle. Pt. reports feeling \"dizzy\" after ambulating x50'. Pt. took a seated rest break x2 minutes, and reported that the dizziness reduced fully. Following exertion, vitals were monitored and found that SPO2 was maintained at 100%, with maximal observed HR at 108bpm (from resting rate of 92bpm). Ambulation 2  Surface - 2: level tile  Device 2: Single point cane (Pt. states that he ambulates with a straight cane at baseline)  Assistance 2: Contact guard assistance  Gait Deviations: Slow Shannon;Decreased head and trunk rotation;Decreased arm swing; Increased [place]; Decreased step height;Decreased step length;Shuffles  Distance: x10'  Comments: Pt. reports no dizziness during ambulation. Stairs/Curb  Stairs?: No     Balance  Posture: Poor  Sitting - Static: Good;-  Sitting - Dynamic: Fair;+  Standing - Static: Fair  Standing - Dynamic: Fair  Comments: Balance formally assessed with RW. In standing, balance screened via pt. completing x10 marches with no LOB and good stability.         Plan   Plan  Times per week: 5-6x/week  Times per day: Daily  Current Treatment Recommendations: Judy Garcia Training,Endurance Training,Patient/Caregiver Education & Training,Cognitive Reorientation,ROM,ADL/Self-care Training,Pain Management,Equipment Evaluation, Education, & procurement,Balance Training,IADL Training,Gait Training,Home Exercise Program,Functional Mobility Training,Cognitive/Perceptual Training,Stair training,Safety Education & Training,Positioning  Safety Devices  Type of devices: Patient at risk for falls,Gait belt,Nurse notified,Left in bed,Call light within reach  Restraints  Initially in place: No    AM-PAC Score  AM-PAC Inpatient Mobility Raw Score : 18 (02/06/22 1619)  AM-PAC Inpatient T-Scale Score : 43.63 (02/06/22 1619)  Mobility Inpatient CMS 0-100% Score: 46.58 (02/06/22 1619)  Mobility Inpatient CMS G-Code Modifier : CK (02/06/22 1619)          Goals  Short term goals  Time Frame for Short term goals: 14 visits. Short term goal 1: Pt. will ambulate with least restrictive AD and SBA x300' without LOB  Short term goal 2: Pt. will negotiate x7 steps with 1HR and CGA without LOB  Short term goal 3: Pt. will maintain static standing balance x1 minute with feet together and eyes closed without LOB  Short term goal 4: Pt. will demonstrate appropriate and safe gait mechanics while ambulating with a straight cane  Short term goal 5: Pt. will demonstrate sit<>stand transfers and all bed mobility with SBA       Therapy Time   Individual Concurrent Group Co-treatment   Time In 0320         Time Out 0401         Minutes 41         Timed Code Treatment Minutes: 45 Minutes       German Meckel, SPT   This treatment/evaluation completed by signing SPT. Signing PT agrees with treatment and documentation.

## 2022-02-06 NOTE — PROGRESS NOTES
[facility]  Internal Medicine Teaching Residency Program  Inpatient Daily Progress Note  ______________________________________________________________________________    Patient: Maxwell Mcintyre  YOB: 1940   [facility]:[id number]    Acct: [de-identified]     Room: [address] [place] [address]  Admit date: 2/1/2022  Today's date: 02/06/22  Number of days in the hospital: 5    SUBJECTIVE   Admitting Diagnosis: Atrial fibrillation with RVR ([facility] [address]  CC: afib with RVR seen on monitor  Pt examined at bedside. Chart & results reviewed. S/p EUS on 2/4. Found liver and pancreatic mass. HR is below 100 today. Waiting placement to SNF.       ROS:  Constitutional:  negative for chills, fevers, sweats  Respiratory:  negative for cough, dyspnea on exertion, hemoptysis, shortness of breath, wheezing  Cardiovascular:  negative for chest pain, chest pressure/discomfort, lower extremity edema, palpitations  Gastrointestinal:  negative for abdominal pain, constipation, diarrhea, nausea, vomiting  Neurological:  negative for dizziness, headache    BRIEF HISTORY     The patient is a pleasant 80 y.o. male presents with a chief complaint of a-fib with RVR seen on monitor. Patient was originally scheduled for EUS today with Dr. Maru Cardona. When he was brought down, monitor showed that he had Afib with hr of 150. Patient denies any palpitations or CP at that time. Patient reports that this has happened before in the week prior. Notably about a month ago, patient was taken for ERCP due to pancreatic mass and had a metal stent placed in CBD . Patient was given lopressor and then switched dot cardizem gtt in the ED. Patient is currently rate controlled. No palpitations or CP present. Patient denies any fevers, chills, SOB, CP. Smokes 2 cigarettes a day, no alcohol, no illegal drugs. Labs remarkable for low TSH with eleveated free thyroxine. Patient currently does not take any thyroid medications. Currently HDS.  On CT imaging show air in portal venous system but this is most likely due to recent biliary stent placement since abdomen is benign. OBJECTIVE     Vital Signs:  /70   Pulse 91   Temp 96.9 °F (36.1 °C) (Axillary)   Resp 14   SpO2 96%     Temp (24hrs), Av.5 °F (36.4 °C), Min:96.9 °F (36.1 °C), Max:98.1 °F (36.7 °C)    In: 360   Out: 1350 [Urine:1350]    Physical Exam:  Physical Exam  Constitutional:       General: He is not in acute distress. Appearance: Normal appearance. He is normal weight. He is not ill-appearing, toxic-appearing or diaphoretic. HENT:      Mouth/Throat:      Mouth: Mucous membranes are moist.      Pharynx: Oropharynx is clear. Eyes:      General: No scleral icterus. Cardiovascular:      Rate and Rhythm: Normal rate and regular rhythm. Pulses: Normal pulses. Heart sounds: Normal heart sounds. No murmur heard. No friction rub. No gallop. Pulmonary:      Effort: Pulmonary effort is normal.      Breath sounds: Normal breath sounds. Abdominal:      General: Abdomen is flat. Bowel sounds are normal. There is no distension. Palpations: Abdomen is soft. There is no mass. Tenderness: There is no abdominal tenderness. There is no guarding or rebound. Hernia: No hernia is present. Musculoskeletal:         General: No swelling, tenderness, deformity or signs of injury. Right lower leg: No edema. Left lower leg: No edema. Skin:     General: Skin is warm and dry. Coloration: Skin is not jaundiced or pale. Findings: No bruising. Neurological:      General: No focal deficit present. Mental Status: He is alert and oriented to person, place, and time. Cranial Nerves: No cranial nerve deficit. Motor: No weakness. Psychiatric:         Mood and Affect: Mood normal.         Behavior: Behavior normal.         Thought Content:  Thought content normal.         Judgment: Judgment normal.           Medications:  Scheduled Medications:    digoxin  125 mcg Oral Daily    apixaban  5 mg Oral BID    metoprolol tartrate  100 mg Oral BID    sodium chloride flush  5-40 mL IntraVENous 2 times per day    methIMAzole  10 mg Oral BID     Continuous Infusions:    sodium chloride       PRN Medicationssodium chloride flush, 5-40 mL, PRN  sodium chloride, 25 mL, PRN  ondansetron, 4 mg, Q8H PRN   Or  ondansetron, 4 mg, Q6H PRN  polyethylene glycol, 17 g, Daily PRN  acetaminophen, 650 mg, Q6H PRN   Or  acetaminophen, 650 mg, Q6H PRN        Diagnostic Labs:  CBC:   Recent Labs     02/04/22 0537 02/05/22 0343 02/06/22  0712   WBC 4.8 6.8 7.7   RBC 3.86* 4.55 3.89*   HGB 12.5* 14.5 12.4*   HCT 37.4* 45.0 38.1*   MCV 96.9 98.9 97.9   RDW 14.7* 14.7* 14.3    204 180     BMP:   Recent Labs     02/04/22 0537 02/05/22 0343    133*   K 3.4* 4.1    98   CO2 21 22   BUN 14 19   CREATININE 0.74 0.95     BNP: No results for input(s): BNP in the last 72 hours. PT/INR:   No results for input(s): PROTIME, INR in the last 72 hours. APTT:   Recent Labs     02/03/22 2236 02/04/22  2110 02/05/22 0343   APTT 57.6* 25.8 30.8*     CARDIAC ENZYMES: No results for input(s): CKMB, CKMBINDEX, TROPONINI in the last 72 hours. Invalid input(s): CKTOTAL;3  FASTING LIPID PANEL:No results found for: CHOL, HDL, TRIG  LIVER PROFILE:   Recent Labs     02/04/22 0537 02/05/22 0343   AST 29 39   ALT 22 27   BILITOT 1.76* 1.93*   ALKPHOS 167* 187*      MICROBIOLOGY:   Lab Results   Component Value Date/Time    CULTURE NO GROWTH 2 DAYS 02/03/2022 09:39 AM       Imaging:    CT ABDOMEN PELVIS W IV CONTRAST Additional Contrast? None    Result Date: 2/1/2022  Findings consistent with pancreatic carcinoma. There is a biliary ductal stent in place which accounts for pneumobilia. RECOMMENDATIONS: Unavailable     CT CHEST PULMONARY EMBOLISM W CONTRAST    Result Date: 2/1/2022  1. No evidence for acute pulmonary embolism.  2. Patchy bilateral irregular ground-glass and more dense airspace densities noted with underlying reticular septal thickening. Imaging features can be seen with COVID pneumonia, though are nonspecific and can occur with a variety of infectious and noninfectious processes. 3. Findings indicative of portal venous gas. There is probably superimposed pneumobilia. Air-fluid level is seen gallbladder presumed partially imaged CBD. Dedicated CT abdomen pelvis with IV contrast and oral contrast is recommended. RECOMMENDATIONS: Unavailable       ASSESSMENT & PLAN     Assessment and Plan:    Principal Problem:    Atrial fibrillation with RVR (HCC)  Active Problems:    Pancreatic mass    History of ERCP    Hyperthyroidism  Resolved Problems:    * No resolved hospital problems.  *    Afib with RVR  - continuous tele  - on amio gtt and had 250 digoxin   - f/u cardiac consult for clearance      Pancreatic mass  - stent placed 1 month ago  - EUS performed 2/4 showing liver and pancreatic mass   - will need f/u OP      Hyperthyroidism  - found to have low TSH of <0.01  - free thyroxine 3.77  - continue methimazole.      Hx of ERCP  - stent placed 1 month ago  - CT showed pneumobilia    Hypokalemia  -resolved            Roni López MD  Internal Medicine Resident  [address] [facility]  2/6/2022 8:25 AM

## 2022-02-07 VITALS
TEMPERATURE: 98.1 F | SYSTOLIC BLOOD PRESSURE: 118 MMHG | RESPIRATION RATE: 10 BRPM | HEART RATE: 87 BPM | DIASTOLIC BLOOD PRESSURE: 75 MMHG | OXYGEN SATURATION: 97 %

## 2022-02-07 LAB
ABSOLUTE EOS #: <0.03 K/UL (ref 0–0.44)
ABSOLUTE IMMATURE GRANULOCYTE: 0.03 K/UL (ref 0–0.3)
ABSOLUTE LYMPH #: 0.94 K/UL (ref 1.1–3.7)
ABSOLUTE MONO #: 0.45 K/UL (ref 0.1–1.2)
ALBUMIN SERPL-MCNC: 2.2 G/DL (ref 3.5–5.2)
ALBUMIN/GLOBULIN RATIO: 0.8 (ref 1–2.5)
ALP BLD-CCNC: 179 U/L (ref 40–129)
ALT SERPL-CCNC: 22 U/L (ref 5–41)
ANION GAP SERPL CALCULATED.3IONS-SCNC: 7 MMOL/L (ref 9–17)
AST SERPL-CCNC: 32 U/L
BASOPHILS # BLD: 0 % (ref 0–2)
BASOPHILS ABSOLUTE: <0.03 K/UL (ref 0–0.2)
BILIRUB SERPL-MCNC: 1.5 MG/DL (ref 0.3–1.2)
BUN BLDV-MCNC: 22 MG/DL (ref 8–23)
BUN/CREAT BLD: ABNORMAL (ref 9–20)
CALCIUM SERPL-MCNC: 8.3 MG/DL (ref 8.6–10.4)
CHLORIDE BLD-SCNC: 102 MMOL/L (ref 98–107)
CO2: 26 MMOL/L (ref 20–31)
CREAT SERPL-MCNC: 0.83 MG/DL (ref 0.7–1.2)
DIFFERENTIAL TYPE: ABNORMAL
DIGOXIN DATE LAST DOSE: NORMAL
DIGOXIN DOSE AMOUNT: NORMAL
DIGOXIN DOSE TIME: NORMAL
DIGOXIN LEVEL: 0.9 NG/ML (ref 0.5–2)
EOSINOPHILS RELATIVE PERCENT: 0 % (ref 1–4)
GFR AFRICAN AMERICAN: >60 ML/MIN
GFR NON-AFRICAN AMERICAN: >60 ML/MIN
GFR SERPL CREATININE-BSD FRML MDRD: ABNORMAL ML/MIN/{1.73_M2}
GFR SERPL CREATININE-BSD FRML MDRD: ABNORMAL ML/MIN/{1.73_M2}
GLUCOSE BLD-MCNC: 132 MG/DL (ref 70–99)
HCT VFR BLD CALC: 39.6 % (ref 40.7–50.3)
HEMOGLOBIN: 13 G/DL (ref 13–17)
IMMATURE GRANULOCYTES: 0 %
LYMPHOCYTES # BLD: 10 % (ref 24–43)
MCH RBC QN AUTO: 32.3 PG (ref 25.2–33.5)
MCHC RBC AUTO-ENTMCNC: 32.8 G/DL (ref 28.4–34.8)
MCV RBC AUTO: 98.3 FL (ref 82.6–102.9)
MONOCYTES # BLD: 5 % (ref 3–12)
NRBC AUTOMATED: 0 PER 100 WBC
PDW BLD-RTO: 14.3 % (ref 11.8–14.4)
PLATELET # BLD: 193 K/UL (ref 138–453)
PLATELET ESTIMATE: ABNORMAL
PMV BLD AUTO: 10.3 FL (ref 8.1–13.5)
POTASSIUM SERPL-SCNC: 4.4 MMOL/L (ref 3.7–5.3)
RBC # BLD: 4.03 M/UL (ref 4.21–5.77)
RBC # BLD: ABNORMAL 10*6/UL
SARS-COV-2, RAPID: NOT DETECTED
SEG NEUTROPHILS: 85 % (ref 36–65)
SEGMENTED NEUTROPHILS ABSOLUTE COUNT: 8.19 K/UL (ref 1.5–8.1)
SODIUM BLD-SCNC: 135 MMOL/L (ref 135–144)
SPECIMEN DESCRIPTION: NORMAL
SURGICAL PATHOLOGY REPORT: NORMAL
SURGICAL PATHOLOGY REPORT: NORMAL
TOTAL PROTEIN: 5 G/DL (ref 6.4–8.3)
WBC # BLD: 9.6 K/UL (ref 3.5–11.3)
WBC # BLD: ABNORMAL 10*3/UL

## 2022-02-07 PROCEDURE — 6370000000 HC RX 637 (ALT 250 FOR IP): Performed by: NURSE PRACTITIONER

## 2022-02-07 PROCEDURE — 80053 COMPREHEN METABOLIC PANEL: CPT

## 2022-02-07 PROCEDURE — 99239 HOSP IP/OBS DSCHRG MGMT >30: CPT | Performed by: INTERNAL MEDICINE

## 2022-02-07 PROCEDURE — 36415 COLL VENOUS BLD VENIPUNCTURE: CPT

## 2022-02-07 PROCEDURE — 97530 THERAPEUTIC ACTIVITIES: CPT

## 2022-02-07 PROCEDURE — 6370000000 HC RX 637 (ALT 250 FOR IP): Performed by: STUDENT IN AN ORGANIZED HEALTH CARE EDUCATION/TRAINING PROGRAM

## 2022-02-07 PROCEDURE — 80162 ASSAY OF DIGOXIN TOTAL: CPT

## 2022-02-07 PROCEDURE — 6360000002 HC RX W HCPCS: Performed by: STUDENT IN AN ORGANIZED HEALTH CARE EDUCATION/TRAINING PROGRAM

## 2022-02-07 PROCEDURE — 2580000003 HC RX 258: Performed by: NURSE PRACTITIONER

## 2022-02-07 PROCEDURE — 97535 SELF CARE MNGMENT TRAINING: CPT

## 2022-02-07 PROCEDURE — 85025 COMPLETE CBC W/AUTO DIFF WBC: CPT

## 2022-02-07 PROCEDURE — 87635 SARS-COV-2 COVID-19 AMP PRB: CPT

## 2022-02-07 PROCEDURE — 97166 OT EVAL MOD COMPLEX 45 MIN: CPT

## 2022-02-07 RX ADMIN — METHIMAZOLE 10 MG: 5 TABLET ORAL at 08:31

## 2022-02-07 RX ADMIN — HYDROCORTISONE SODIUM SUCCINATE 50 MG: 100 INJECTION, POWDER, FOR SOLUTION INTRAMUSCULAR; INTRAVENOUS at 03:13

## 2022-02-07 RX ADMIN — DIGOXIN 125 MCG: 125 TABLET ORAL at 08:31

## 2022-02-07 RX ADMIN — SODIUM CHLORIDE, PRESERVATIVE FREE 10 ML: 5 INJECTION INTRAVENOUS at 08:35

## 2022-02-07 RX ADMIN — METOPROLOL TARTRATE 100 MG: 50 TABLET, FILM COATED ORAL at 08:31

## 2022-02-07 RX ADMIN — APIXABAN 5 MG: 5 TABLET, FILM COATED ORAL at 08:31

## 2022-02-07 ASSESSMENT — PAIN SCALES - GENERAL
PAINLEVEL_OUTOF10: 0

## 2022-02-07 NOTE — PROGRESS NOTES
Occupational Therapy   Occupational Therapy Initial Assessment  Date: 2022   Patient Name: Maxwell Benitez  MRN: [id number]     : 1940    Date of Service: 2022  Chief Complaint   Patient presents with    Atrial Fibrillation       Discharge Recommendations:    Further therapy recommended at discharge. OT Equipment Recommendations  Equipment Needed: Yes  Mobility Devices: ADL Assistive Devices  ADL Assistive Devices: Grab Bars - shower    Assessment   Performance deficits / Impairments: Decreased functional mobility ; Decreased ADL status; Decreased endurance;Decreased high-level IADLs;Decreased balance;Decreased strength  Assessment: pt would benefit from further therapy at discharge in order to increase safety and independence. pt reported being independent at baseline with limited DME use and completing all ADLs and IADLs independenttly. Treatment Diagnosis: A-fib  Prognosis: Good  Decision Making: Medium Complexity  Patient Education: pt ed on POC, purpose of eval, importance of movement, safety during functional transfers/functional mobility, pursed lip breathing tech. good return  REQUIRES OT FOLLOW UP: Yes  Activity Tolerance  Activity Tolerance: Patient Tolerated treatment well  Safety Devices  Safety Devices in place: Yes  Type of devices: Gait belt;Call light within reach; Left in bed;Bed alarm in place  Restraints  Initially in place: No           Patient Diagnosis(es): The encounter diagnosis was Atrial fibrillation with RVR ([facility] [address] has a past medical history of A-fib ([facility] [address] Arthritis, Hypertension, Pancreatic mass, and Thyroid disease. has a past surgical history that includes ERCP; hernia repair; and Upper gastrointestinal endoscopy (2022).     Treatment Diagnosis: A-fib      Restrictions  Restrictions/Precautions  Restrictions/Precautions: Fall Risk  Required Braces or Orthoses?: No  Position Activity Restriction  Other position/activity restrictions: up as tolerated    Subjective General  Patient assessed for rehabilitation services?: Yes  Family / Caregiver Present: No  General Comment  Comments: RN ok'd for therapy this afternoon. pt agreeable to participate in session and cooperative/pleasant throughout  Patient Currently in Pain: Denies (at rest)  Vital Signs  Patient Currently in Pain: Denies (at rest)     Social/Functional History  Social/Functional History  Lives With: Alone  Type of Home: House  Home Layout: Two level,Laundry in basement,Bed/Bath upstairs,1/2 bath on main level  Home Access: Stairs to enter with rails  Entrance Stairs - Number of Steps: 6-7  Entrance Stairs - Rails: Both  Bathroom Shower/Tub: Tub/Shower unit  Bathroom Toilet: Standard  Bathroom Equipment: Shower chair  Home Equipment: Cane (pt reported only using cane outside home)  Receives Help From: Family (sister and brother in law)  ADL Assistance: Independent  Homemaking Assistance: Independent  Homemaking Responsibilities: Yes  Meal Prep Responsibility: Primary  Laundry Responsibility: Primary  Cleaning Responsibility: Primary  Ambulation Assistance: Independent  Transfer Assistance: Independent  Active : Yes  Patient's  Info: Pt. reports that his sister is able to provide transportation as needed. Mode of Transportation: Car  Occupation: Retired  Leisure & Hobbies: watching sports  Additional Comments: pt reported recently started having HHA for bathing supervision. pt reported sister able to assist PRN       Objective   Vision: Impaired (pt reported occasional use of glasses)  Hearing: Within functional limits    Orientation  Overall Orientation Status: Within Functional Limits     Balance  Sitting Balance: Modified independent  (~12 minutes on EOB And in chair)  Standing Balance: Contact guard assistance (with RW)  Standing Balance  Time: ~3 minutes  Activity: pt completed functional mobility in hallway to simulate household distances  Comment: pt required 1 seated rest break due to fatigue. pt HR upon completion 140, but quickly decreased to 117 after returned to supine in bed  Functional Mobility  Functional - Mobility Device: Rolling Walker  Activity: Other  Assist Level: Contact guard assistance  ADL  Feeding: Modified independent   Grooming: Modified independent   UE Bathing: Stand by assistance  LE Bathing: Minimal assistance  UE Dressing: Stand by assistance  LE Dressing: Minimal assistance  Toileting: Minimal assistance  Additional Comments: pt adjusted B socks while sitting on EOB with SBA for safety  Tone RUE  RUE Tone: Normotonic  Tone LUE  LUE Tone: Normotonic  Coordination  Movements Are Fluid And Coordinated: Yes     Bed mobility  Supine to Sit: Stand by assistance  Sit to Supine: Stand by assistance  Scooting: Stand by assistance  Comment: pt required increased time to complete  Transfers  Sit to stand: Contact guard assistance  Stand to sit: Contact guard assistance  Transfer Comments: with RW     Cognition  Overall Cognitive Status: WFL        Sensation  Overall Sensation Status: WFL        LUE AROM (degrees)  LUE AROM : WFL  Left Hand AROM (degrees)  Left Hand AROM: WFL  RUE AROM (degrees)  RUE AROM : WFL  Right Hand AROM (degrees)  Right Hand AROM: WFL  LUE Strength  Gross LUE Strength: Exceptions to [place] [place]  L Shoulder Flex: 4/5  L Hand General: 4/5  RUE Strength  Gross RUE Strength: Exceptions to [facility] [place]  R Shoulder Flex: 4/5  R Hand General: 4/5            Plan   Plan  Times per week: 3-4x/wk    AM-PAC Score        AM-[facility] Inpatient Daily Activity Raw Score: 20 (02/07/22 1359)  AM-PAC Inpatient ADL T-Scale Score : 42.03 (02/07/22 [facility]9)  ADL Inpatient CMS 0-100% Score: 38.32 (02/07/22 [facility]9)  ADL Inpatient CMS G-Code Modifier : Merrick Bernard (02/07/22 [facility]9)    Goals  Short term goals  Time Frame for Short term goals: pt will, by discharge  Short term goal 1: complete LB ADLs and toileting tasks with SBA and AE, as needed  Short term goal 2: complete UB ADLs with mod I  Short term goal 3: increase activity tolerance to 25+ minutes in order to participate in daily tasks  Short term goal 4: dem SBA during functional transfers/functional mobility with LRD, as needed  Short term goal 5: dem ~6 minutes dynamic standing tolerance with SBA and 0 rest breaks in order to complete functional tasks       Therapy Time   Individual Concurrent Group Co-treatment   Time In 1317         Time Out 1344         Minutes 27      co-eval with PT    Timed Code Treatment Minutes: Macarena [id number], OTR/L

## 2022-02-07 NOTE — CARE COORDINATION
Transitional Planning    Per OT, they will see patient after lunch. Monique koch, here for on site eval.     1303 received call from [facility] requesting SSN, spoke with patient- . Called [facility] and updated    1430 received call from [facility] intake, they have a bed available for patient today if agreeable to [place] location. Sister has concerns of Dr's appointment tomorrow at 6 am at [facility]. She states she is agreeable to transport. [phone] spoke to patient and sister at bedside. They are agreeable to [place] location.  Spoke with Fawad Dubon and updated    1625 per [facility] transport ETA [phone]    1630 per [employer], transport ETA [phone], [facility] canceled     # for Report [phone]

## 2022-02-07 NOTE — PROGRESS NOTES
Physical Therapy  Facility/Department: [facility] 4A STEPDOWN  Daily Treatment Note  NAME: Silvia Barillas  : 4251  MRN: [id number]    Date of Service: 2022    Discharge Recommendations:  Patient would benefit from continued therapy after discharge   PT Equipment Recommendations  Equipment Needed: Yes  Mobility Devices: Sanchez Taylor: Rolling    Assessment   Body structures, Functions, Activity limitations: Decreased functional mobility ; Decreased safe awareness;Decreased balance;Decreased ADL status; Decreased vision/visual deficit; Decreased ROM; Decreased endurance;Decreased high-level IADLs;Decreased strength;Decreased posture  Assessment: The pt ambulated 50 ft x 2 with a RW x CGA. He tired easily with mobilization and needed rest breaks. He could benefit from a continuation of PT to regain his PLOF  Prognosis: Good  Decision Making: Medium Complexity  PT Education: Goals  REQUIRES PT FOLLOW UP: Yes  Activity Tolerance  Activity Tolerance: Patient limited by endurance     Patient Diagnosis(es): The encounter diagnosis was Atrial fibrillation with RVR ([facility] [address] has a past medical history of A-fib ([facility] [address] Arthritis, Hypertension, Pancreatic mass, and Thyroid disease. has a past surgical history that includes ERCP; hernia repair; and Upper gastrointestinal endoscopy (2022). Restrictions  Restrictions/Precautions  Restrictions/Precautions: Fall Risk  Required Braces or Orthoses?: No  Position Activity Restriction  Other position/activity restrictions: up as tolerated  Subjective   General  Response To Previous Treatment: Patient with no complaints from previous session. Family / Caregiver Present: No  Subjective  Subjective: Pt. found supine in bed with HOB elevated ~30 degrees. RN and pt. agreeable to PT evaluation at this time. Pt. remained cooperative and pleasant throughout the session.   Pain Screening  Patient Currently in Pain: Denies  Pain Assessment  Pain Assessment: 0-10  Pain Level: 0  Vital Signs  Patient Currently in Pain: Denies       Orientation  Orientation  Overall Orientation Status: Within Functional Limits  Cognition      Objective   Bed mobility  Supine to Sit: Contact guard assistance  Sit to Supine: Contact guard assistance  Scooting: Contact guard assistance  Transfers  Sit to Stand: Contact guard assistance  Stand to sit: Contact guard assistance  Ambulation  Ambulation?: Yes  Ambulation 1  Surface: level tile  Device: Rolling Walker  Assistance: Contact guard assistance  Gait Deviations: Slow Shannon;Decreased head and trunk rotation;Decreased arm swing; Increased MONROE;Shuffles;Decreased step height;Decreased step length  Distance: amb 50 ft x 2 with a RW x CGA   AROM B LE LAQ's, ankle pumps x 10  Balance  Posture: Good  Sitting - Static: Fair  Sitting - Dynamic: Fair  Standing - Static: Fair  Standing - Dynamic: Fair      G-Code     OutComes Score     AM-PAC Score  AM-PAC Inpatient Mobility Raw Score : 17 (02/07/22 1526)  AM-PAC Inpatient T-Scale Score : 42.13 (02/07/22 1526)  Mobility Inpatient CMS 0-100% Score: 50.57 (02/07/22 1526)  Mobility Inpatient CMS G-Code Modifier : CK (02/07/22 1526)     Goals  Short term goals  Time Frame for Short term goals: 14 visits.   Short term goal 1: Pt. will ambulate with least restrictive AD and SBA x300' without LOB  Short term goal 2: Pt. will negotiate x7 steps with 1HR and CGA without LOB  Short term goal 3: Pt. will maintain static standing balance x1 minute with feet together and eyes closed without LOB  Short term goal 4: Pt. will demonstrate appropriate and safe gait mechanics while ambulating with a straight cane  Short term goal 5: Pt. will demonstrate sit<>stand transfers and all bed mobility with SBA    Plan    Plan  Times per week: 5-6x/week  Times per day: Daily  Current Treatment Recommendations: Strengthening,Transfer Training,Endurance Training,Patient/Caregiver Education & Training,Cognitive Reorientation,ROM,ADL/Self-care Training,Pain Management,Equipment Evaluation, Education, & procurement,Balance Training,IADL Training,Gait Training,Home Exercise Program,Functional Mobility Training,Cognitive/Perceptual Training,Stair training,Safety Education & Training,Positioning  Safety Devices  Type of devices: Patient at risk for falls,Gait belt,Nurse notified,Left in bed,Call light within reach  Restraints  Initially in place: No     Therapy Time   Individual Concurrent Group Co-treatment   Time In 1315         Time Out 1338         Minutes 640 Alfreda Perkins, PT

## 2022-02-07 NOTE — PROGRESS NOTES
[facility]  Internal Medicine Teaching Residency Program  Inpatient Daily Progress Note  ______________________________________________________________________________    Patient: Mona Sotomayor  YOB: 1940   XYW:[id number]    Acct: [de-identified]     Room: [address] [place] [address]  Admit date: 2/1/2022  Today's date: 02/07/22  Number of days in the hospital: 6    SUBJECTIVE   Admitting Diagnosis: Atrial fibrillation with RVR ([address]  CC: afib with RVR seen on monitor  Pt examined at bedside. Chart & results reviewed. S/p EUS on 2/4. Found liver and pancreatic mass HR is much improved today. No palpitations or any chest pain. HDS afebrile. ROS:  Constitutional:  negative for chills, fevers, sweats  Respiratory:  negative for cough, dyspnea on exertion, hemoptysis, shortness of breath, wheezing  Cardiovascular:  negative for chest pain, chest pressure/discomfort, lower extremity edema, palpitations  Gastrointestinal:  negative for abdominal pain, constipation, diarrhea, nausea, vomiting  Neurological:  negative for dizziness, headache    BRIEF HISTORY     The patient is a pleasant 80 y.o. male presents with a chief complaint of a-fib with RVR seen on monitor. Patient was originally scheduled for EUS today with Dr. Yue Ashley. When he was brought down, monitor showed that he had Afib with hr of 150. Patient denies any palpitations or CP at that time. Patient reports that this has happened before in the week prior. Notably about a month ago, patient was taken for ERCP due to pancreatic mass and had a metal stent placed in [place] . Patient was given lopressor and then switched dot cardizem gtt in the ED. Patient is currently rate controlled. No palpitations or CP present. Patient denies any fevers, chills, SOB, CP. Smokes 2 cigarettes a day, no alcohol, no illegal drugs. Labs remarkable for low TSH with eleveated free thyroxine. Patient currently does not take any thyroid medications. Currently HDS. On CT imaging show air in portal venous system but this is most likely due to recent biliary stent placement since abdomen is benign. OBJECTIVE     Vital Signs:  /78   Pulse 92   Temp 98 °F (36.7 °C) (Oral)   Resp 16   SpO2 98%     Temp (24hrs), Av.6 °F (36.4 °C), Min:97 °F (36.1 °C), Max:98 °F (36.7 °C)    In: 250   Out: 600 [Urine:600]    Physical Exam:  Physical Exam  Constitutional:       General: He is not in acute distress. Appearance: Normal appearance. He is normal weight. He is not ill-appearing, toxic-appearing or diaphoretic. HENT:      Mouth/Throat:      Mouth: Mucous membranes are moist.      Pharynx: Oropharynx is clear. Eyes:      General: No scleral icterus. Cardiovascular:      Rate and Rhythm: Normal rate and regular rhythm. Pulses: Normal pulses. Heart sounds: Normal heart sounds. No murmur heard. No friction rub. No gallop. Pulmonary:      Effort: Pulmonary effort is normal.      Breath sounds: Normal breath sounds. Abdominal:      General: Abdomen is flat. Bowel sounds are normal. There is no distension. Palpations: Abdomen is soft. There is no mass. Tenderness: There is no abdominal tenderness. There is no guarding or rebound. Hernia: No hernia is present. Musculoskeletal:         General: No swelling, tenderness, deformity or signs of injury. Right lower leg: No edema. Left lower leg: No edema. Skin:     General: Skin is warm and dry. Coloration: Skin is not jaundiced or pale. Findings: No bruising. Neurological:      General: No focal deficit present. Mental Status: He is alert and oriented to person, place, and time. Cranial Nerves: No cranial nerve deficit. Motor: No weakness. Psychiatric:         Mood and Affect: Mood normal.         Behavior: Behavior normal.         Thought Content:  Thought content normal.         Judgment: Judgment normal. Medications:  Scheduled Medications:    digoxin  125 mcg Oral Daily    apixaban  5 mg Oral BID    metoprolol tartrate  100 mg Oral BID    sodium chloride flush  5-40 mL IntraVENous 2 times per day    methIMAzole  10 mg Oral BID     Continuous Infusions:    sodium chloride       PRN Medicationssodium chloride flush, 5-40 mL, PRN  sodium chloride, 25 mL, PRN  ondansetron, 4 mg, Q8H PRN   Or  ondansetron, 4 mg, Q6H PRN  polyethylene glycol, 17 g, Daily PRN  acetaminophen, 650 mg, Q6H PRN   Or  acetaminophen, 650 mg, Q6H PRN        Diagnostic Labs:  CBC:   Recent Labs     02/05/22 0343 02/06/22  0712 02/07/22  0516   WBC 6.8 7.7 9.6   RBC 4.55 3.89* 4.03*   HGB 14.5 12.4* 13.0   HCT 45.0 38.1* 39.6*   MCV 98.9 97.9 98.3   RDW 14.7* 14.3 14.3    180 193     BMP:   Recent Labs     02/05/22 0343 02/06/22 0712 02/07/22  0516   * 131* 135   K 4.1 3.8 4.4   CL 98 97* 102   CO2 22 24 26   BUN 19 21 22   CREATININE 0.95 0.65* 0.83     BNP: No results for input(s): BNP in the last 72 hours. PT/INR:   No results for input(s): PROTIME, INR in the last 72 hours. APTT:   Recent Labs     02/04/22 2110 02/05/22 0343   APTT 25.8 30.8*     CARDIAC ENZYMES: No results for input(s): CKMB, CKMBINDEX, TROPONINI in the last 72 hours. Invalid input(s): CKTOTAL;3  FASTING LIPID PANEL:No results found for: CHOL, HDL, TRIG  LIVER PROFILE:   Recent Labs     02/05/22 0343 02/06/22  0712 02/07/22  0516   AST 39 21 32   ALT 27 19 22   BILITOT 1.93* 1.50* 1.50*   ALKPHOS 187* 138* 179*      MICROBIOLOGY:   Lab Results   Component Value Date/Time    CULTURE NO GROWTH 3 DAYS 02/03/2022 09:39 AM       Imaging:    CT ABDOMEN PELVIS W IV CONTRAST Additional Contrast? None    Result Date: 2/1/2022  Findings consistent with pancreatic carcinoma. There is a biliary ductal stent in place which accounts for pneumobilia. RECOMMENDATIONS: Unavailable     CT CHEST PULMONARY EMBOLISM W CONTRAST    Result Date: 2/1/2022  1. No evidence for acute pulmonary embolism. 2. Patchy bilateral irregular ground-glass and more dense airspace densities noted with underlying reticular septal thickening. Imaging features can be seen with COVID pneumonia, though are nonspecific and can occur with a variety of infectious and noninfectious processes. 3. Findings indicative of portal venous gas. There is probably superimposed pneumobilia. Air-fluid level is seen gallbladder presumed partially imaged CBD. Dedicated CT abdomen pelvis with IV contrast and oral contrast is recommended. RECOMMENDATIONS: Unavailable       ASSESSMENT & PLAN     Assessment and Plan:    Principal Problem:    Atrial fibrillation with RVR (HCC)  Active Problems:    Pancreatic mass    History of ERCP    Hyperthyroidism  Resolved Problems:    * No resolved hospital problems. *    Afib with RVR  - continuous tele  - off amio, on PO digoxin      Pancreatic mass  - stent placed 1 month ago  - EUS performed 2/4 showing liver and pancreatic mass   - will need f/u OP      Hyperthyroidism  - found to have low TSH of <0.01  - free thyroxine 3.77  - continue methimazole.  Decrease does of methimazole to 10mg QD with follow up with his endocrinologist.      Hx of ERCP  - stent placed 1 month ago  - CT showed pneumobilia    Hypokalemia  -resolved            Jewell Day MD  Internal Medicine Resident  [facility]  2/7/2022 9:07 AM

## 2022-02-08 LAB
CULTURE: NORMAL
CULTURE: NORMAL
Lab: NORMAL
Lab: NORMAL
SPECIMEN DESCRIPTION: NORMAL
SPECIMEN DESCRIPTION: NORMAL

## 2022-02-08 NOTE — ANESTHESIA POSTPROCEDURE EVALUATION
Department of Anesthesiology  Postprocedure Note    Patient: Harjit Mendoza  MRN: [id number]  YOB: 1940  Date of evaluation: 2/8/2022  Time:  11:07 AM     Procedure Summary     Date: 02/04/22 Room / Location: [address]    Anesthesia Start: 1016 Anesthesia Stop: 1213    Procedures:       EGD W/EUS FNA (N/A )      EGD BIOPSY (N/A ) Diagnosis: (PANCREATIC HEAD MASS, SIGNIFICANT WEIGHT LOSS)    Surgeons: Dale Paula MD Responsible Provider: Tony Ernst MD    Anesthesia Type: MAC ASA Status: 4          Anesthesia Type: MAC    Addie Phase I: Addie Score: 9    Addie Phase II:      Last vitals: Reviewed and per EMR flowsheets.        Anesthesia Post Evaluation    Patient location during evaluation: PACU  Patient participation: complete - patient participated  Level of consciousness: sleepy but conscious  Airway patency: patent  Complications: no  Cardiovascular status: blood pressure returned to baseline  Respiratory status: acceptable

## 2022-02-09 NOTE — DISCHARGE SUMMARY
[address]     Department of Internal Medicine - Staff Internal Medicine Teaching Service    INPATIENT DISCHARGE SUMMARY      Patient Identification:  Jasson Umana is a 80 y.o. male. :  1940  MRN: [id number]     Acct: [de-identified]   PCP: Dona Cooper  Admit Date:  2022  Discharge date and time: 2022  Attending Provider: No att. providers found                                     [address] Problem Lists:  Principal Problem:    Atrial fibrillation with RVR ([address]  Active Problems:    Pancreatic mass    History of ERCP    Hyperthyroidism  Resolved Problems:    * No resolved hospital problems. *      HOSPITAL STAY     Brief Inpatient course:   Jasson Umana is a 80 y.o. male who was admitted for the management of Atrial fibrillation with RVR [facility]), presented to the emergency department with chief complaint of a-fib with RVR seen on monitor. Patient was originally scheduled for EUS today with Dr. Karon Mathew. When he was brought down, monitor showed that he had Afib with hr of 150. Patient denies any palpitations or CP at that time. Patient reports that this has happened before in the week prior. Notably about a month ago, patient was taken for ERCP due to pancreatic mass and had a metal stent placed in [place]. Was placed on diltiazem gtt. : TSH low, T4 was high. Patient was found to be in hyperthyroidism. Has methimazole as home med. Was restarted    2/3: switched to amio gtt and heparin gtt     : underwent EUS, oral digoxin, off amio. : hydrocortisone was given. Heart rate controlled. On lopressor and digoxin per cardio reccs    -: patient needs to go to SNF for rehab. Was sent to SNF on     Procedures/ Significant Interventions:      No results found.         Consults:     Consults:     Final Specialist Recommendations/Findings:   IP CONSULT TO GI  IP CONSULT TO INTERNAL MEDICINE  IP CONSULT TO CARDIOLOGY  IP CONSULT TO CARDIOLOGY      Any Hospital Acquired Infections: none    Discharge Functional Status:  stable    DISCHARGE PLAN     Disposition: SNF    Patient Instructions: You were admitted to hospital for A. fib with RVR when you initially came for EUS for definitive diagnosis of pancreatic mass. EUS was done and pancreatic mass biopsies were taken. You are being discharged with SNF in stable condition.  -Start taking digoxin 125 mcg daily  -Lopressor 100 mg twice daily  -Methimazole 10 mg twice daily  -Patient to follow with PCP, GI and cardiology. Follow ups given    -Follow-up with pancreatic biopsy pathology  Discharge Medication List as of 2/7/2022  4:33 PM      START taking these medications    Details   digoxin (LANOXIN) 125 MCG tablet Take 1 tablet by mouth daily, Disp-30 tablet, R-3Normal         CONTINUE these medications which have CHANGED    Details   metoprolol tartrate (LOPRESSOR) 100 MG tablet Take 1 tablet by mouth 2 times daily, Disp-60 tablet, R-3Normal      methIMAzole (TAPAZOLE) 10 MG tablet Take 1 tablet by mouth 2 times daily, Disp-60 tablet, R-2Normal         CONTINUE these medications which have NOT CHANGED    Details   apixaban (ELIQUIS) 5 MG TABS tablet Take 5 mg by mouth 2 times dailyHistorical Med      magnesium oxide (MAG-OX) 400 MG tablet Take 400 mg by mouth dailyHistorical Med      potassium chloride (KLOR-CON) 10 MEQ extended release tablet Take 10 mEq by mouth 2 times dailyHistorical Med      ursodiol (ACTIGALL) 300 MG capsule Take 300 mg by mouth 2 times dailyHistorical Med             Activity: activity as tolerated    Diet: regular diet    Follow-up:    Aden Padilla MD  [address] [facility]  [id number]  [place] [address] [address]    Schedule an appointment as soon as possible for a visit      [phone]  [address]  [phone]    Schedule an appointment as soon as possible for a visit in 1 week  hosp fu.  Pancreatic biopsies pathology to be followed    Bong Read MD  [facility], [address] [place] [address]  [phone]    Schedule an appointment as soon as possible for a visit in 1 week  fib      Patient Instructions: You were admitted to hospital for A. fib with RVR when you initially came for EUS for definitive diagnosis of pancreatic mass. EUS was done and pancreatic mass biopsies were taken. You are being discharged with SNF in stable condition.  -Start taking digoxin 125 mcg daily  -Lopressor 100 mg twice daily  -Methimazole 10 mg twice daily  -Patient to follow with PCP, GI and cardiology.  Follow ups given    -Follow-up with pancreatic biopsy pathology    Note that over 30 minutes was spent in preparing discharge papers, discussing discharge with patient, medication review, etc.      Kyrie Banks MD  Internal Medicine Resident  [address] [facility]  2/9/2022 10:31 AM

## (undated) DEVICE — NEEDLE BX DIA25GA FN ENDOSCP SHARKCORE

## (undated) DEVICE — SYSTEM BX 25GA FN NDL SIX DST CUT EDG SHARKCORE

## (undated) DEVICE — TUBING, SUCTION, 9/32" X 20', STRAIGHT: Brand: MEDLINE INDUSTRIES, INC.

## (undated) DEVICE — FORCEPS BX L240CM WRK CHN 2.8MM STD CAP W/ NDL MIC MESH

## (undated) DEVICE — GARMENT,MEDLINE,DVT,INT,CALF,MED, GEN2: Brand: MEDLINE